# Patient Record
Sex: FEMALE | Race: OTHER | HISPANIC OR LATINO | Employment: FULL TIME | URBAN - METROPOLITAN AREA
[De-identification: names, ages, dates, MRNs, and addresses within clinical notes are randomized per-mention and may not be internally consistent; named-entity substitution may affect disease eponyms.]

---

## 2020-10-26 ENCOUNTER — HOSPITAL ENCOUNTER (EMERGENCY)
Facility: OTHER | Age: 25
Discharge: HOME | End: 2020-10-27
Attending: EMERGENCY MEDICINE
Payer: COMMERCIAL

## 2020-10-26 DIAGNOSIS — D64.9 SYMPTOMATIC ANEMIA: Primary | ICD-10-CM

## 2020-10-26 DIAGNOSIS — N92.4 EXCESSIVE BLEEDING IN PREMENOPAUSAL PERIOD: ICD-10-CM

## 2020-10-26 PROBLEM — D62 ACUTE ON CHRONIC BLOOD LOSS ANEMIA: Status: ACTIVE | Noted: 2020-10-26

## 2020-10-26 LAB
ABO + RH BLD: NORMAL
ALBUMIN SERPL BCP-MCNC: 4.1 G/DL (ref 3.5–5.2)
ALP SERPL-CCNC: 36 U/L (ref 55–135)
ALT SERPL W/O P-5'-P-CCNC: 13 U/L (ref 10–44)
ANION GAP SERPL CALC-SCNC: 12 MMOL/L (ref 8–16)
ANISOCYTOSIS BLD QL SMEAR: ABNORMAL
ANISOCYTOSIS BLD QL SMEAR: SLIGHT
AST SERPL-CCNC: 39 U/L (ref 10–40)
B-HCG UR QL: NEGATIVE
BASOPHILS # BLD AUTO: 0.01 K/UL (ref 0–0.2)
BASOPHILS # BLD AUTO: 0.04 K/UL (ref 0–0.2)
BASOPHILS # BLD AUTO: 0.04 K/UL (ref 0–0.2)
BASOPHILS NFR BLD: 0.1 % (ref 0–1.9)
BASOPHILS NFR BLD: 0.4 % (ref 0–1.9)
BASOPHILS NFR BLD: 0.5 % (ref 0–1.9)
BILIRUB SERPL-MCNC: 0.2 MG/DL (ref 0.1–1)
BLD GP AB SCN CELLS X3 SERPL QL: NORMAL
BLD PROD TYP BPU: NORMAL
BLD PROD TYP BPU: NORMAL
BLOOD UNIT EXPIRATION DATE: NORMAL
BLOOD UNIT EXPIRATION DATE: NORMAL
BLOOD UNIT TYPE CODE: 5100
BLOOD UNIT TYPE CODE: 5100
BLOOD UNIT TYPE: NORMAL
BLOOD UNIT TYPE: NORMAL
BUN SERPL-MCNC: 15 MG/DL (ref 6–20)
BURR CELLS BLD QL SMEAR: ABNORMAL
CALCIUM SERPL-MCNC: 9.1 MG/DL (ref 8.7–10.5)
CHLORIDE SERPL-SCNC: 108 MMOL/L (ref 95–110)
CO2 SERPL-SCNC: 18 MMOL/L (ref 23–29)
CODING SYSTEM: NORMAL
CODING SYSTEM: NORMAL
CREAT SERPL-MCNC: 0.8 MG/DL (ref 0.5–1.4)
CTP QC/QA: YES
CTP QC/QA: YES
DACRYOCYTES BLD QL SMEAR: ABNORMAL
DIFFERENTIAL METHOD: ABNORMAL
DISPENSE STATUS: NORMAL
DISPENSE STATUS: NORMAL
EOSINOPHIL # BLD AUTO: 0 K/UL (ref 0–0.5)
EOSINOPHIL # BLD AUTO: 0 K/UL (ref 0–0.5)
EOSINOPHIL # BLD AUTO: 0.1 K/UL (ref 0–0.5)
EOSINOPHIL NFR BLD: 0.4 % (ref 0–8)
EOSINOPHIL NFR BLD: 0.5 % (ref 0–8)
EOSINOPHIL NFR BLD: 0.5 % (ref 0–8)
ERYTHROCYTE [DISTWIDTH] IN BLOOD BY AUTOMATED COUNT: 18.7 % (ref 11.5–14.5)
ERYTHROCYTE [DISTWIDTH] IN BLOOD BY AUTOMATED COUNT: 24.2 % (ref 11.5–14.5)
ERYTHROCYTE [DISTWIDTH] IN BLOOD BY AUTOMATED COUNT: 26.2 % (ref 11.5–14.5)
EST. GFR  (AFRICAN AMERICAN): >60 ML/MIN/1.73 M^2
EST. GFR  (NON AFRICAN AMERICAN): >60 ML/MIN/1.73 M^2
FERRITIN SERPL-MCNC: <1 NG/ML (ref 20–300)
GLUCOSE SERPL-MCNC: 83 MG/DL (ref 70–110)
HCT VFR BLD AUTO: 20.8 % (ref 37–48.5)
HCT VFR BLD AUTO: 23.9 % (ref 37–48.5)
HCT VFR BLD AUTO: 25.9 % (ref 37–48.5)
HCT VFR BLD CALC: 19 %PCV (ref 36–54)
HGB BLD-MCNC: 5.4 G/DL (ref 12–16)
HGB BLD-MCNC: 6.8 G/DL (ref 12–16)
HGB BLD-MCNC: 7 G/DL
HGB BLD-MCNC: 7.6 G/DL (ref 12–16)
HYPOCHROMIA BLD QL SMEAR: ABNORMAL
HYPOCHROMIA BLD QL SMEAR: ABNORMAL
IMM GRANULOCYTES # BLD AUTO: 0.02 K/UL (ref 0–0.04)
IMM GRANULOCYTES # BLD AUTO: 0.03 K/UL (ref 0–0.04)
IMM GRANULOCYTES # BLD AUTO: 0.03 K/UL (ref 0–0.04)
IMM GRANULOCYTES NFR BLD AUTO: 0.3 % (ref 0–0.5)
IMM GRANULOCYTES NFR BLD AUTO: 0.3 % (ref 0–0.5)
IMM GRANULOCYTES NFR BLD AUTO: 0.4 % (ref 0–0.5)
IRON SERPL-MCNC: 20 UG/DL (ref 30–160)
LYMPHOCYTES # BLD AUTO: 2.3 K/UL (ref 1–4.8)
LYMPHOCYTES # BLD AUTO: 2.4 K/UL (ref 1–4.8)
LYMPHOCYTES # BLD AUTO: 2.5 K/UL (ref 1–4.8)
LYMPHOCYTES NFR BLD: 24.2 % (ref 18–48)
LYMPHOCYTES NFR BLD: 29.4 % (ref 18–48)
LYMPHOCYTES NFR BLD: 33.9 % (ref 18–48)
MCH RBC QN AUTO: 15.7 PG (ref 27–31)
MCH RBC QN AUTO: 18.4 PG (ref 27–31)
MCH RBC QN AUTO: 19.8 PG (ref 27–31)
MCHC RBC AUTO-ENTMCNC: 26 G/DL (ref 32–36)
MCHC RBC AUTO-ENTMCNC: 28.5 G/DL (ref 32–36)
MCHC RBC AUTO-ENTMCNC: 29.3 G/DL (ref 32–36)
MCV RBC AUTO: 60 FL (ref 82–98)
MCV RBC AUTO: 65 FL (ref 82–98)
MCV RBC AUTO: 67 FL (ref 82–98)
MONOCYTES # BLD AUTO: 0.5 K/UL (ref 0.3–1)
MONOCYTES # BLD AUTO: 0.6 K/UL (ref 0.3–1)
MONOCYTES # BLD AUTO: 0.7 K/UL (ref 0.3–1)
MONOCYTES NFR BLD: 6 % (ref 4–15)
MONOCYTES NFR BLD: 7.7 % (ref 4–15)
MONOCYTES NFR BLD: 8.4 % (ref 4–15)
NEUTROPHILS # BLD AUTO: 3.9 K/UL (ref 1.8–7.7)
NEUTROPHILS # BLD AUTO: 5.1 K/UL (ref 1.8–7.7)
NEUTROPHILS # BLD AUTO: 6.8 K/UL (ref 1.8–7.7)
NEUTROPHILS NFR BLD: 57.6 % (ref 38–73)
NEUTROPHILS NFR BLD: 60.8 % (ref 38–73)
NEUTROPHILS NFR BLD: 68.6 % (ref 38–73)
NRBC BLD-RTO: 0 /100 WBC
OVALOCYTES BLD QL SMEAR: ABNORMAL
OVALOCYTES BLD QL SMEAR: ABNORMAL
PLATELET # BLD AUTO: 311 K/UL (ref 150–350)
PLATELET # BLD AUTO: 343 K/UL (ref 150–350)
PLATELET # BLD AUTO: 356 K/UL (ref 150–350)
PLATELET BLD QL SMEAR: ABNORMAL
PLATELET BLD QL SMEAR: ABNORMAL
PMV BLD AUTO: 10.1 FL (ref 9.2–12.9)
PMV BLD AUTO: 10.6 FL (ref 9.2–12.9)
PMV BLD AUTO: ABNORMAL FL (ref 9.2–12.9)
POC IONIZED CALCIUM: 1.29 MMOL/L (ref 1.06–1.42)
POIKILOCYTOSIS BLD QL SMEAR: ABNORMAL
POLYCHROMASIA BLD QL SMEAR: ABNORMAL
POTASSIUM BLD-SCNC: 3.6 MMOL/L (ref 3.5–5.1)
POTASSIUM SERPL-SCNC: 3.9 MMOL/L (ref 3.5–5.1)
PROT SERPL-MCNC: 8 G/DL (ref 6–8.4)
RBC # BLD AUTO: 3.45 M/UL (ref 4–5.4)
RBC # BLD AUTO: 3.69 M/UL (ref 4–5.4)
RBC # BLD AUTO: 3.84 M/UL (ref 4–5.4)
SAMPLE: ABNORMAL
SARS-COV-2 RDRP RESP QL NAA+PROBE: NEGATIVE
SATURATED IRON: 4 % (ref 20–50)
SCHISTOCYTES BLD QL SMEAR: ABNORMAL
SODIUM BLD-SCNC: 139 MMOL/L (ref 136–145)
SODIUM SERPL-SCNC: 138 MMOL/L (ref 136–145)
TOTAL IRON BINDING CAPACITY: 531 UG/DL (ref 250–450)
TRANS ERYTHROCYTES VOL PATIENT: NORMAL ML
TRANS ERYTHROCYTES VOL PATIENT: NORMAL ML
TRANSFERRIN SERPL-MCNC: 359 MG/DL (ref 200–375)
WBC # BLD AUTO: 6.75 K/UL (ref 3.9–12.7)
WBC # BLD AUTO: 8.32 K/UL (ref 3.9–12.7)
WBC # BLD AUTO: 9.92 K/UL (ref 3.9–12.7)

## 2020-10-26 PROCEDURE — 82728 ASSAY OF FERRITIN: CPT

## 2020-10-26 PROCEDURE — 83540 ASSAY OF IRON: CPT

## 2020-10-26 PROCEDURE — 84295 ASSAY OF SERUM SODIUM: CPT

## 2020-10-26 PROCEDURE — 63600175 PHARM REV CODE 636 W HCPCS: Performed by: PHYSICIAN ASSISTANT

## 2020-10-26 PROCEDURE — 80053 COMPREHEN METABOLIC PANEL: CPT

## 2020-10-26 PROCEDURE — 85014 HEMATOCRIT: CPT | Mod: 59

## 2020-10-26 PROCEDURE — 86901 BLOOD TYPING SEROLOGIC RH(D): CPT

## 2020-10-26 PROCEDURE — 36430 TRANSFUSION BLD/BLD COMPNT: CPT

## 2020-10-26 PROCEDURE — 99900035 HC TECH TIME PER 15 MIN (STAT)

## 2020-10-26 PROCEDURE — 99282 EMERGENCY DEPT VISIT SF MDM: CPT | Mod: ,,, | Performed by: OBSTETRICS & GYNECOLOGY

## 2020-10-26 PROCEDURE — 99282 PR EMERGENCY DEPT VISIT,LEVEL II: ICD-10-PCS | Mod: ,,, | Performed by: OBSTETRICS & GYNECOLOGY

## 2020-10-26 PROCEDURE — 86920 COMPATIBILITY TEST SPIN: CPT

## 2020-10-26 PROCEDURE — 81025 URINE PREGNANCY TEST: CPT | Performed by: EMERGENCY MEDICINE

## 2020-10-26 PROCEDURE — P9021 RED BLOOD CELLS UNIT: HCPCS

## 2020-10-26 PROCEDURE — 84132 ASSAY OF SERUM POTASSIUM: CPT

## 2020-10-26 PROCEDURE — 85025 COMPLETE CBC W/AUTO DIFF WBC: CPT | Mod: 91

## 2020-10-26 PROCEDURE — 99285 EMERGENCY DEPT VISIT HI MDM: CPT | Mod: 25

## 2020-10-26 PROCEDURE — 25000003 PHARM REV CODE 250: Performed by: PHYSICIAN ASSISTANT

## 2020-10-26 PROCEDURE — 82330 ASSAY OF CALCIUM: CPT

## 2020-10-26 PROCEDURE — U0002 COVID-19 LAB TEST NON-CDC: HCPCS | Performed by: EMERGENCY MEDICINE

## 2020-10-26 RX ORDER — HYDROCODONE BITARTRATE AND ACETAMINOPHEN 500; 5 MG/1; MG/1
TABLET ORAL
Status: DISCONTINUED | OUTPATIENT
Start: 2020-10-26 | End: 2020-10-27 | Stop reason: HOSPADM

## 2020-10-26 RX ORDER — DIPHENHYDRAMINE HCL 25 MG
50 CAPSULE ORAL
Status: COMPLETED | OUTPATIENT
Start: 2020-10-26 | End: 2020-10-26

## 2020-10-26 RX ORDER — PREDNISONE 20 MG/1
40 TABLET ORAL
Status: COMPLETED | OUTPATIENT
Start: 2020-10-26 | End: 2020-10-26

## 2020-10-26 RX ORDER — ACETAMINOPHEN 500 MG
1000 TABLET ORAL
Status: COMPLETED | OUTPATIENT
Start: 2020-10-26 | End: 2020-10-26

## 2020-10-26 RX ADMIN — PREDNISONE 40 MG: 20 TABLET ORAL at 11:10

## 2020-10-26 RX ADMIN — ACETAMINOPHEN 1000 MG: 500 TABLET ORAL at 11:10

## 2020-10-26 RX ADMIN — DIPHENHYDRAMINE HYDROCHLORIDE 50 MG: 25 CAPSULE ORAL at 11:10

## 2020-10-26 NOTE — ED NOTES
Patient Identifiers for Lea Jarvis checked and correct  Assumed care of pt from ER, First unit of PRBC's infusing, denies c/o SOB/chest pain, urticaria, will continue to monitor  LOC: The patient is awake, alert and aware of environment with an appropriate affect, the patient is oriented x 3 and speaking appropriate.  APPEARANCE: Patient resting comfortably and in no acute distress, patient is clean and well groomed, patient's clothing is properly fastened.  SKIN: The skin is warm and dry, patient has normal skin turgor and moist mucus membranes,no rashes or lesions.Skin Intact , No Breakdown Noted  Musculoskeletal :  Normal range of motion noted. Moves all extremeties well, No swelling or tenderness noted  RESPIRATORY: Airway is open and patent, respirations are spontaneous, patient has a normal effort and rate.Bilateral Lungs Sounds are clear  CARDIAC: Patient has a normal rate and rhythm, no periphreal edema noted, capillary refill < 3 seconds.   ABDOMEN: Soft and non tender to palpation, no distention noted.   PULSES: 2+  And symmetrical in all extremeties  NEUROLOGIC: PERRL, The patient is awake, alert and cooperative with a calm affect, patient is aware of environment.    Will continue to monitor

## 2020-10-26 NOTE — CONSULTS
[facility]  Obstetrics & Gynecology  Consult Note    Patient Name: Lea Jarvis  MRN: [id number]  Admission Date: 10/26/2020  Hospital Length of Stay: 0 days  Code Status: No Order  Primary Care Provider: JARETH Dee  Principal Problem: Acute on chronic blood loss anemia    Inpatient consult to Obstetrics  Consult performed by: Katherine C Boecking, MD  Consult ordered by: Meño Taylor MD        Subjective:     Chief Complaint: Anemia    History of Present Illness: 26 yo  with chronic anemia presents to ED for H/H 5.4/20.8. Patient also reports heavy menstrual bleeding and is currently on her cycle. Gynecology consulted to evaluate.    On evaluation, patient sitting up comfortably in bed. She denies any dizziness or lightheadedness. She reports she is chronically anemic, and was getting routine blood work done by PCP. Her blood work came back and H/H 5.4/20.8 and was advised to present to ED for blood transfusion. She denies any transufions in the past.    Patient currently at end of her cycle. She reports her cycles have always been heavy, but have recently become heavier within the past year. She saw a Gynecologist at [facility] in January for heavy cycles. TVUS done at that time showed evidence of 2 cm fibroid. Cycles are regular, occurring approximately every 28 days. She uses a Diva Cup, and on heavy days has to change out cup every 1-2 hours. Cycles are mildly painful. She denies bleeding episodes, easy bruising, new sexual partners, vaginal discharge, dysuria, abdominal pain, nausea/vomiting, fevers/chills. She denies any history of STDs or any abnormal pap smears.     Of note, she was recently started on NuvaRing and placed ring last Monday.     No current facility-administered medications on file prior to encounter.      No current outpatient medications on file prior to encounter.       Review of patient's allergies indicates:  No Known Allergies    Past Medical History:    Diagnosis Date    Fibroids      OB History   No obstetric history on file.     History reviewed. No pertinent surgical history.  Family History     None        Tobacco Use    Smoking status: Never Smoker    Smokeless tobacco: Never Used   Substance and Sexual Activity    Alcohol use: Yes     Comment: occ    Drug use: Not Currently    Sexual activity: Not on file     Review of Systems   Constitutional: Negative for chills and fever.   Respiratory: Negative for shortness of breath.    Cardiovascular: Negative for chest pain.   Gastrointestinal: Negative for abdominal pain, constipation, diarrhea, nausea and vomiting.   Endocrine: Negative for hot flashes.   Genitourinary: Positive for dysuria, menorrhagia and vaginal bleeding. Negative for hematuria, menstrual problem and vaginal discharge.   Neurological: Negative for headaches.   Hematological: Does not bruise/bleed easily.   Psychiatric/Behavioral: Negative for depression.     Objective:     Vital Signs (Most Recent):  Temp: 99.1 °F (37.3 °C) (10/26/20 1718)  Pulse: 88 (10/26/20 1718)  Resp: 18 (10/26/20 1718)  BP: 113/61 (10/26/20 1718)  SpO2: 100 % (10/26/20 1718) Vital Signs (24h Range):  Temp:  [99.1 °F (37.3 °C)-99.4 °F (37.4 °C)] 99.1 °F (37.3 °C)  Pulse:  [83-90] 88  Resp:  [18] 18  SpO2:  [100 %] 100 %  BP: (109-129)/(58-75) 113/61     Weight: 59 kg (130 lb)  Body mass index is 23.78 kg/m².  No LMP recorded.    Physical Exam:   Constitutional: She is oriented to person, place, and time. She appears well-developed and well-nourished. No distress.    HENT:   Head: Normocephalic and atraumatic.    Eyes: Pupils are equal, round, and reactive to light.    Neck: Normal range of motion.    Cardiovascular: Normal rate.     Pulmonary/Chest: Effort normal. No respiratory distress.        Abdominal: Soft. Normal appearance. She exhibits no distension. There is no abdominal tenderness. There is no rebound and no guarding.     Genitourinary:    Uterus, right  adnexa and left adnexa normal.   Cervix is normal. There is a normal right adnexa and a normal left adnexa. There is bleeding in the vagina. Cervix exhibits no motion tenderness, no discharge and no friability.    Genitourinary Comments: External genitalia: Normal  Urethra: No tenderness; normal meatus  Bladder: No tenderness  Scant blood noted in vaginal vault cleared with one proctoswab. No active bleeding from cervical os.              Musculoskeletal: Normal range of motion.       Neurological: She is alert and oriented to person, place, and time.    Skin: Skin is warm and dry.    Psychiatric: She has a normal mood and affect. Her behavior is normal. Mood, affect, judgment and thought content normal.       Laboratory:  CBC:   Recent Labs   Lab 10/26/20  1549 10/26/20  1600   WBC 6.75  --    RBC 3.45*  --    HGB 5.4*  --    HCT 20.8* 19*   *  --    MCV 60*  --    MCH 15.7*  --    MCHC 26.0*  --        Diagnostic Results:  TVUS   Dhiraj, Rad Results In - 07/06/2020 11:32 AM CDT    INDICATION: Abnormal uterine bleeding, possible fibroid    COMPARISON: None    TECHNIQUE: Multiple grayscale and color Doppler images of the pelvis were obtained in the supine position utilizing the transabdominaltechnique.  Spectral Doppler evaluation of both ovaries was performed.    FINDINGS:     The uterus is anteverted measuring 10.8 cm length by 6.0 cm AP by 7.4 cm transverse dimensions.  A posterior myometrial fibroid demonstrates decreased echogenicity and measures 2.3 x 2.2 x 2.1 cm.  There appears to be a small broad-based submucosal component.  Endometrial stripe is diffusely thinned measuring 2 mm in thickness.    The RIGHT ovary measures 3.6 x 1.8 x 2.1 cm.  Normal sonographic appearance to the RIGHT ovary.  LEFT ovary measures 3.6 x 1.7 x 3.1 cm.  Normal sonographic appearance of the LEFT ovary.  Spectral Doppler evaluation demonstrates normal arterial and venous waveforms.  No free fluid in the  cul-de-sac.    IMPRESSION:   ::    1.   2.3 cm posterior myometrial fibroid with small broad-based submucosal component.  2.   Homogeneous endometrial stripe measuring 2 mm in thickness.    Assessment/Plan:     Active Diagnoses:    Diagnosis Date Noted POA    PRINCIPAL PROBLEM:  Acute on chronic blood loss anemia [D62] 10/26/2020 Yes      Problems Resolved During this Admission:       1. Acute on Chronic Anemia  - VSS  - Asymptomatic  - H/H 5.4/20.8  - Agree with transfusion 1 uPRBC    2. AUB  - Reports heavier cycles requiring divacup change q1-2 hours  - TVUS with 2 cm fibroid, otherwise normal uterus  - Follows Dr. Renner at [facility]  - Recently started NuvaRing 1 week ago, discussed options such as additional OCP to control acute bleeding episode and to prevent bleeding in the future, or waiting to see if NuvaRing will work  - Patient desires to wait for NuvaRing and does not want additional OCPs at this time  - Offered follow up in our clinic within the week, or she may see her primary Ob/Gyn. She is undecided on who she wants to see, but will follow up within a week.     Thank you for your consult. I will sign off. Please contact us if you have any additional questions.    Katherine C Boecking, MD  Obstetrics & Gynecology  [facility]

## 2020-10-26 NOTE — ED TRIAGE NOTES
Pt reports to ED with c/o abnormal lab x 3 days. Pt states she was seen at PCP on Friday and had blood drawn. Pt was called by PCP today and informed of Hemoglobin <5, was told to report to ED for transfusion. Pt denies any lightheadedness, LOC, other s/s. Pt has hx of uterine fibroids, state she has been having heavier cycles since January. Pt is currently on her menstrual cycle.

## 2020-10-26 NOTE — ED NOTES
Received report from JOHNNY Pantoja. Assumed care of patient at this time. Patient lying on stretcher with HOB elevated.  AAOx4 and appropriate at this time. Restroom and comfort needs addressed.  Pt updated on POC. RR even and unlabored, NAD noted. No acute distress noted.  SRx2 up, bed in lowest position, call light within reach.  Will continue to monitor.

## 2020-10-26 NOTE — H&P
ED Observation Unit  History and Physical      I assumed care of this patient from the Main ED at onset of observation time, 5:23 PM on 10/26/2020.       History of Present Illness:    This is a 25 y.o. female who presents with complaint of abnormal labs. The patient had routine blood work drawn on 10/23/2020, which was ordered by her PCP. She received a phone call today from her PCP stating that her hemoglobin was 5. The patient reports that she has regular menstrual periods, but they last longer than seven days and are heavy secondary to fibroids. She notes that she is a little more pale. She denies chest pain and shortness of breath but does note decreased exercise tolerance when working out she attributed to not working out regularly.      I reviewed the ED Provider Note dated 10/26/2020 prior to my evaluation of this patient.  I reviewed all labs and imaging performed in the Main ED, prior to patient being placed in Observation. Patient was placed in the ED Observation Unit for Acute on chronic blood loss anemia.    PMHx   Past Medical History:   Diagnosis Date    Fibroids       History reviewed. No pertinent surgical history.     Family Hx   History reviewed. No pertinent family history.     Social Hx   Social History     Socioeconomic History    Marital status: Single     Spouse name: Not on file    Number of children: Not on file    Years of education: Not on file    Highest education level: Not on file   Occupational History    Not on file   Social Needs    Financial resource strain: Not on file    Food insecurity     Worry: Not on file     Inability: Not on file    Transportation needs     Medical: Not on file     Non-medical: Not on file   Tobacco Use    Smoking status: Never Smoker    Smokeless tobacco: Never Used   Substance and Sexual Activity    Alcohol use: Yes     Comment: occ    Drug use: Not Currently    Sexual activity: Not on file   Lifestyle    Physical activity     Days per  week: Not on file     Minutes per session: Not on file    Stress: Not on file   Relationships    Social connections     Talks on phone: Not on file     Gets together: Not on file     Attends [employer] service: Not on file     Active member of club or organization: Not on file     Attends meetings of clubs or organizations: Not on file     Relationship status: Not on file   Other Topics Concern    Not on file   Social History Narrative    Not on file        Vital Signs   Vitals:    10/26/20 1534 10/26/20 1631 10/26/20 1703 10/26/20 1718   BP: (!) 115/58 109/75 115/65 113/61   BP Location:       Patient Position:       Pulse: 89 83 90 88   Resp:   18 18   Temp:   99.4 °F (37.4 °C) 99.1 °F (37.3 °C)   TempSrc:   Oral Oral   SpO2: 100% 100% 100% 100%   Weight:       Height:            Review of Systems  Review of Systems   Genitourinary:        Vaginal bleeding        Physical Exam  Physical Exam   Constitutional: She is oriented to person, place, and time and well-developed, well-nourished, and in no distress. No distress.   HENT:   Head: Normocephalic and atraumatic.   Eyes: Pupils are equal, round, and reactive to light. Conjunctivae are normal.   Neck: Normal range of motion. No JVD present. No tracheal deviation present. No thyromegaly present.   Cardiovascular: Normal heart sounds and intact distal pulses.   Pulmonary/Chest: Effort normal. No stridor.   Abdominal: Soft.   Genitourinary:    Genitourinary Comments: Defer to GYN staff     Musculoskeletal: Normal range of motion.   Lymphadenopathy:     She has no cervical adenopathy.   Neurological: She is alert and oriented to person, place, and time. GCS score is 15.   Skin: Skin is warm. She is not diaphoretic.   Nursing note and vitals reviewed.      Medications:   Scheduled Meds:  Continuous Infusions:  PRN Meds:.sodium chloride      Assessment/Plan:  1. Acute on Chronic Anemia  - VSS  - Asymptomatic at this time  - H/H 5.4/20.8  - Currently getting  transfusion of 1u PRBC, will re-check CBC once complete     2. AUB  - Reports heavier cycles requiring divacup change q1-2 hours  - TVUS with 2 cm fibroid, otherwise normal uterus  - Follow GYN recs for outpatient follow-up.  - Thanks GYN for your consult     Case was discussed with the ED provider, Brandon (any consultants).

## 2020-10-26 NOTE — FIRST PROVIDER EVALUATION
Emergency Department TeleTriage Encounter Note      CHIEF COMPLAINT    Chief Complaint   Patient presents with    Abnormal Lab     Pt recieved a call from her PCP today that her Hmgb was 5 from lab work that was done last week. Sent here for a transfusion. pt reports eating more ice than usual and heavy vag bleeding secondary to fibroids.        VITAL SIGNS   Initial Vitals [10/26/20 1506]   BP Pulse Resp Temp SpO2   129/67 87 18 99.1 °F (37.3 °C) 100 %      MAP       --            ALLERGIES    Review of patient's allergies indicates:  No Known Allergies    PROVIDER TRIAGE NOTE  Patient is a 25 y.o. female presenting for anemia. Had outpatient labs done by PCP last week, contacted today and told to come in for blood transfusion, hemoglobin was 5. Denies history of blood transfusion. Repeat labs ordered.     ORDERS  Labs Reviewed   HIV 1 / 2 ANTIBODY   HEPATITIS C ANTIBODY   CBC W/ AUTO DIFFERENTIAL   COMPREHENSIVE METABOLIC PANEL   POCT URINE PREGNANCY   TYPE & SCREEN       ED Orders (720h ago, onward)    Start Ordered     Status Ordering Provider    10/26/20 1521 10/26/20 1520  CBC auto differential  STAT  Collect    Ordered MARY QUACH    10/26/20 1521 10/26/20 1520  Comprehensive metabolic panel  STAT  Collect    Ordered CARLTON QUACHOLE    10/26/20 1521 10/26/20 1520  Type & Screen  STAT      Ordered MARY QUACH    10/26/20 1521 10/26/20 1520  Saline lock IV  Once      Ordered CARLTON QUACHOLE    10/26/20 1509 10/26/20 1508  POCT urine pregnancy  Once      Ordered ALEXANDER SOLANO L. II    10/26/20 1508 10/26/20 1508  HIV 1/2 Ag/Ab (4th Gen)  STAT  Collect    Ordered ALEXANDER SOLANO L. II    10/26/20 1508 10/26/20 1508  Hepatitis C antibody  STAT  Collect    Ordered ALEXANDER SOLANO L. II            Virtual Visit Note: The provider triage portion of this emergency department evaluation and documentation was performed via [email or web address], a HIPAA-compliant telemedicine application, in concert with a tele-presenter  in the room. A face to face patient evaluation with one of my colleagues will occur once the patient is placed in an emergency department room.      DISCLAIMER: This note was prepared with [employer] voice recognition transcription software. Garbled syntax, mangled pronouns, and other bizarre constructions may be attributed to that software system.

## 2020-10-27 VITALS
HEIGHT: 62 IN | WEIGHT: 130 LBS | DIASTOLIC BLOOD PRESSURE: 59 MMHG | TEMPERATURE: 99 F | SYSTOLIC BLOOD PRESSURE: 108 MMHG | BODY MASS INDEX: 23.92 KG/M2 | RESPIRATION RATE: 18 BRPM | OXYGEN SATURATION: 100 % | HEART RATE: 82 BPM

## 2020-10-27 RX ORDER — FERROUS SULFATE 325(65) MG
325 TABLET ORAL DAILY
Qty: 90 TABLET | Refills: 0 | Status: SHIPPED | OUTPATIENT
Start: 2020-10-27 | End: 2022-09-06

## 2020-10-27 RX ORDER — DOCUSATE SODIUM 100 MG/1
100 CAPSULE, LIQUID FILLED ORAL 2 TIMES DAILY PRN
Qty: 60 CAPSULE | Refills: 0 | Status: SHIPPED | OUTPATIENT
Start: 2020-10-27 | End: 2022-09-06

## 2020-10-27 NOTE — PROGRESS NOTES
[facility] ED Observation Unit  Discharge Summary        History of Present Illness:    This is a 25 y.o. female who presents with complaint of abnormal labs. The patient had routine blood work drawn on 10/23/2020, which was ordered by her PCP. She received a phone call today from her PCP stating that her hemoglobin was 5. The patient reports that she has regular menstrual periods, but they last longer than seven days and are heavy secondary to fibroids. She notes that she is a little more pale. She denies chest pain and shortness of breath but does note decreased exercise tolerance when working out she attributed to not working out regularly.     Observation Course:    Patient admitted for transfusion of an asymptomatic anemia that was critically low on labs obtained by her PCP. After 1 unit PRBC, patient's H/H improved to appropriate level. Patient did have hives during transfusion which resolved with benadryl. Patient was able to complete transfusion at slower rate. The rest of patient's course was unremarkable and patient requested discharge.     Consultants:    OBGYN    Final Diagnosis:  Acute on Chronic Anemia  Abnormal Uterine Bleeding    Assessment / Plan:  1. Acute on Chronic Anemia  - Vital signs normal  - Asymptomatic at this time  - H/H 7/25 after transfusion  - Will initiate Iron / colace treatment     2. AUB  - Evaluated by OBGYN, recs appreciated  - Follow up in one weekwith OBGYN    3. Transfusion Reaction  - Minor and resolved      Discharge Condition: Good    Disposition: Home     Time spent on the discharge of the patient including review of hospital course with the patient. reviewing discharge medications and arranging follow-up care 35 minutes.  Patient was seen and examined on the date of discharge and determined to be suitable for discharge.    Follow Up:  May follow up with Ha Lopez whom she sees as an outpatient or with [facility].        Assessment/plan    Patient  discharged from ED observation at 3:44 a.m., 10/27/2020.  Total observation time was

## 2020-10-27 NOTE — PROGRESS NOTES
ED Observation Unit  Progress Note      HPI   This is a 25 y.o. female who presents with complaint of abnormal labs. The patient had routine blood work drawn on 10/23/2020, which was ordered by her PCP. She received a phone call today from her PCP stating that her hemoglobin was 5. The patient reports that she has regular menstrual periods, but they last longer than seven days and are heavy secondary to fibroids. She notes that she is a little more pale. She denies chest pain and shortness of breath but does note decreased exercise tolerance when working out she attributed to not working out regularly.       I reviewed the ED Provider Note dated 10/26/2020 prior to my evaluation of this patient.  I reviewed all labs and imaging performed in the Main ED, prior to patient being placed in Observation. Patient was placed in the ED Observation Unit for Acute on chronic blood loss anemia.    Interval History   Patient doing well in ED OU, stable vital signs and no complaints. 9:02 PM she has completed one unit of PRBC and has an increase in H&H to 6.8 and 23.9 (up from 5.4 & 20.8). Vaginal bleeding has not changed throughout ED OU stay thus far.     PMHx   Past Medical History:   Diagnosis Date    Fibroids       History reviewed. No pertinent surgical history.     Family Hx   History reviewed. No pertinent family history.     Social Hx   Social History     Socioeconomic History    Marital status: Single     Spouse name: Not on file    Number of children: Not on file    Years of education: Not on file    Highest education level: Not on file   Occupational History    Not on file   Social Needs    Financial resource strain: Not on file    Food insecurity     Worry: Not on file     Inability: Not on file    Transportation needs     Medical: Not on file     Non-medical: Not on file   Tobacco Use    Smoking status: Never Smoker    Smokeless tobacco: Never Used   Substance and Sexual Activity    Alcohol use: Yes      Comment: occ    Drug use: Not Currently    Sexual activity: Not on file   Lifestyle    Physical activity     Days per week: Not on file     Minutes per session: Not on file    Stress: Not on file   Relationships    Social connections     Talks on phone: Not on file     Gets together: Not on file     Attends Rastafarian service: Not on file     Active member of club or organization: Not on file     Attends meetings of clubs or organizations: Not on file     Relationship status: Not on file   Other Topics Concern    Not on file   Social History Narrative    Not on file        Vital Signs   Vitals:    10/26/20 1718 10/26/20 1743 10/26/20 2008 10/26/20 2034   BP: 113/61 124/66 110/67 (!) 111/57   BP Location:  Left arm     Patient Position:  Sitting     Pulse: 88 92 89 84   Resp: 18 18 18 18   Temp: 99.1 °F (37.3 °C) 98.9 °F (37.2 °C) 98.1 °F (36.7 °C) 98.9 °F (37.2 °C)   TempSrc: Oral Oral     SpO2: 100% 100% 100% 100%   Weight:       Height:            Review of Systems  Review of Systems   Constitutional: Negative for chills and fever.   HENT: Negative for hearing loss and tinnitus.    Eyes: Negative for blurred vision and double vision.   Respiratory: Negative for shortness of breath.    Cardiovascular: Negative for chest pain.   Gastrointestinal: Negative for abdominal pain, nausea and vomiting.   Genitourinary:        Vaginal bleeding    Musculoskeletal: Negative for back pain and neck pain.   Neurological: Negative for dizziness, weakness and headaches.       Brief Physical Exam/Reassessment   Physical Exam   Constitutional: She is oriented to person, place, and time and well-developed, well-nourished, and in no distress. No distress.   HENT:   Head: Normocephalic and atraumatic.   Eyes: Pupils are equal, round, and reactive to light.   Neck: Normal range of motion.   Cardiovascular: Normal rate.   Pulmonary/Chest: Effort normal and breath sounds normal. No respiratory distress. She exhibits no tenderness.    Abdominal: Soft.   Musculoskeletal: Normal range of motion.   Lymphadenopathy:     She has no cervical adenopathy.   Neurological: She is alert and oriented to person, place, and time. GCS score is 15.   Skin: Skin is warm and dry. She is not diaphoretic. No pallor.   Nursing note and vitals reviewed.      Labs/Imaging   Labs Reviewed   CBC W/ AUTO DIFFERENTIAL - Abnormal; Notable for the following components:       Result Value    RBC 3.45 (*)     Hemoglobin 5.4 (*)     Hematocrit 20.8 (*)     Mean Corpuscular Volume 60 (*)     Mean Corpuscular Hemoglobin 15.7 (*)     Mean Corpuscular Hemoglobin Conc 26.0 (*)     RDW 18.7 (*)     Platelets 356 (*)     All other components within normal limits    Narrative:     Hgb  critical result(s) called and verbal readback obtained from   Alia Rodriguez by TAW1 10/26/2020 16:30   COMPREHENSIVE METABOLIC PANEL - Abnormal; Notable for the following components:    CO2 18 (*)     Alkaline Phosphatase 36 (*)     All other components within normal limits   IRON AND TIBC - Abnormal; Notable for the following components:    Iron 20 (*)     TIBC 531 (*)     Saturated Iron 4 (*)     All other components within normal limits   CBC W/ AUTO DIFFERENTIAL - Abnormal; Notable for the following components:    RBC 3.69 (*)     Hemoglobin 6.8 (*)     Hematocrit 23.9 (*)     Mean Corpuscular Volume 65 (*)     Mean Corpuscular Hemoglobin 18.4 (*)     Mean Corpuscular Hemoglobin Conc 28.5 (*)     RDW 24.2 (*)     All other components within normal limits   ISTAT PROCEDURE - Abnormal; Notable for the following components:    POC Hematocrit 19 (*)     All other components within normal limits   HIV 1 / 2 ANTIBODY   HEPATITIS C ANTIBODY   FERRITIN   POCT URINE PREGNANCY   SARS-COV-2 RDRP GENE   TYPE & SCREEN   PREPARE RBC SOFT   PREPARE RBC SOFT      Imaging Results    None          I reviewed all labs, imaging, EKGs.     Plan   1. Acute on Chronic Anemia  - VSS  - continues to be asx in EDOU  - H/H  initially 5.4/20.8 up to 6.8/23.9 after 1st unit of PRBC, plan to transfuse another unit now.   -will re-check CBC once complete and likely discharge if H&H increase appropriately.    2. AUB  - heavier cycles recently  -known fibroids on TVUS  -GYN is Dr. Renner at [facility], pt plans to follow-up at [facility] but is also given [facility] GYN follow-up info if needed  -continue current NuvaRing regimen, pt opts to not start additional OCPs at this time.   -Follow-up plan recommended by GYN who has signed off after initial consult.   -Thanks to GYN for your consult.       I have discussed this case with CESAR Taylor MD.

## 2020-10-27 NOTE — ED NOTES
Per provider it is okay for patient to be discharged from observation unit to home. Provider has discussed the findings with the patient. Discharge paperwork and education has been given to the patient at this time. Patient in NAD. She is able to ambulate with a strong and steady gait. Respirations are even and unlabored. Nothing further at this time.

## 2020-10-27 NOTE — ED NOTES
Rounding on patient completed.  Patient lying on stretcher with HOB elevated.  Pt lying in bed, RR even and unlabored, NAD noted. No acute distress noted.  SRx2 up, bed in lowest position, call light within reach. Pt has no complaints or needs at this time.  Updated patient on POC.  Will continue to monitor.

## 2020-10-27 NOTE — ED NOTES
Rounding on patient completed.  Patient lying on stretcher with HOB elevated.  Pt resting comfortably, boyfriend remains at bedside. RR even and unlabored, NAD noted. No acute distress noted.  SRx2 up, bed in lowest position, call light within reach.  Will continue to monitor.

## 2020-10-27 NOTE — ED NOTES
Blood transfusion stopped 50ml of blood left in bag.  Per OLIVIA Tavarez blood transfusion will not be started again.  At this time blood transfusion complete

## 2020-10-27 NOTE — ED NOTES
Pt called over call light.  Reports to RN that hives have began to pop up on chest and legs. Blood infusion stopped line flushed with NS.  OLIVIA Tavarez notified.

## 2020-10-27 NOTE — ED NOTES
Rounding on patient completed.  Patient lying on stretcher with HOB elevated.  Pt asleep resting comfortably. RR even and unlabored, NAD noted. No acute distress noted.  SRx2 up, bed in lowest position, call light within reach.  Will continue to monitor.

## 2020-10-27 NOTE — ED NOTES
Blood transfusion complete. Pt tolerated well. Repeat CBC complete awaiting results.  Pt updated on POC, boyfriend at bedside. Will continue to monitor.

## 2020-10-27 NOTE — PROGRESS NOTES
ED Observation Unit  Progress Note      HPI   This is a 25 y.o. female who presents with complaint of abnormal labs. The patient had routine blood work drawn on 10/23/2020, which was ordered by her PCP. She received a phone call today from her PCP stating that her hemoglobin was 5. The patient reports that she has regular menstrual periods, but they last longer than seven days and are heavy secondary to fibroids. She notes that she is a little more pale. She denies chest pain and shortness of breath but does note decreased exercise tolerance when working out she attributed to not working out regularly.       I reviewed the ED Provider Note dated 10/26/2020 prior to my evaluation of this patient.  I reviewed all labs and imaging performed in the Main ED, prior to patient being placed in Observation. Patient was placed in the ED Observation Unit for Acute on chronic blood loss anemia.    Interval History   Patient developed scattered urticaria to legs and torso during transfusion of second unit of blood. No other system involvement. Blood was stopped, benadryl given. Will plan for OBS in ED for 4 hours to monitor for progression/resolution of symptoms.     PMHx   Past Medical History:   Diagnosis Date    Fibroids       History reviewed. No pertinent surgical history.     Family Hx   History reviewed. No pertinent family history.     Social Hx   Social History     Socioeconomic History    Marital status: Single     Spouse name: Not on file    Number of children: Not on file    Years of education: Not on file    Highest education level: Not on file   Occupational History    Not on file   Social Needs    Financial resource strain: Not on file    Food insecurity     Worry: Not on file     Inability: Not on file    Transportation needs     Medical: Not on file     Non-medical: Not on file   Tobacco Use    Smoking status: Never Smoker    Smokeless tobacco: Never Used   Substance and Sexual Activity    Alcohol  use: Yes     Comment: occ    Drug use: Not Currently    Sexual activity: Not on file   Lifestyle    Physical activity     Days per week: Not on file     Minutes per session: Not on file    Stress: Not on file   Relationships    Social connections     Talks on phone: Not on file     Gets together: Not on file     Attends Holiness service: Not on file     Active member of club or organization: Not on file     Attends meetings of clubs or organizations: Not on file     Relationship status: Not on file   Other Topics Concern    Not on file   Social History Narrative    Not on file        Vital Signs   Vitals:    10/26/20 2008 10/26/20 2034 10/26/20 2121 10/26/20 2144   BP: 110/67 (!) 111/57 117/60 (!) 115/58   BP Location:       Patient Position:       Pulse: 89 84 90 93   Resp: 18 18 18 18   Temp: 98.1 °F (36.7 °C) 98.9 °F (37.2 °C) 99.1 °F (37.3 °C) 99.6 °F (37.6 °C)   TempSrc:   Oral Oral   SpO2: 100% 100% 100% 100%   Weight:       Height:            Review of Systems  Review of Systems   Skin:        urticaria       Brief Physical Exam/Reassessment   Physical Exam   Constitutional: She is well-developed, well-nourished, and in no distress. No distress.   HENT:   Head: Normocephalic.   oropharynx is clear    Eyes: Pupils are equal, round, and reactive to light.   Neck: Normal range of motion.   Cardiovascular: Normal rate and normal heart sounds.   Pulmonary/Chest: Effort normal. No respiratory distress. She has no wheezes. She has no rales.   Lungs CTA bilaterally    Abdominal: Soft.   Benign    Skin: Rash noted. She is not diaphoretic.   Scattered urticaria to bilateral upper legs and chest wall.    Nursing note and vitals reviewed.      Labs/Imaging   Labs Reviewed   CBC W/ AUTO DIFFERENTIAL - Abnormal; Notable for the following components:       Result Value    RBC 3.45 (*)     Hemoglobin 5.4 (*)     Hematocrit 20.8 (*)     Mean Corpuscular Volume 60 (*)     Mean Corpuscular Hemoglobin 15.7 (*)     Mean  Corpuscular Hemoglobin Conc 26.0 (*)     RDW 18.7 (*)     Platelets 356 (*)     All other components within normal limits    Narrative:     Hgb  critical result(s) called and verbal readback obtained from   Alia Rodriguez by TAW1 10/26/2020 16:30   COMPREHENSIVE METABOLIC PANEL - Abnormal; Notable for the following components:    CO2 18 (*)     Alkaline Phosphatase 36 (*)     All other components within normal limits   IRON AND TIBC - Abnormal; Notable for the following components:    Iron 20 (*)     TIBC 531 (*)     Saturated Iron 4 (*)     All other components within normal limits   FERRITIN - Abnormal; Notable for the following components:    Ferritin <1 (*)     All other components within normal limits   CBC W/ AUTO DIFFERENTIAL - Abnormal; Notable for the following components:    RBC 3.69 (*)     Hemoglobin 6.8 (*)     Hematocrit 23.9 (*)     Mean Corpuscular Volume 65 (*)     Mean Corpuscular Hemoglobin 18.4 (*)     Mean Corpuscular Hemoglobin Conc 28.5 (*)     RDW 24.2 (*)     All other components within normal limits   ISTAT PROCEDURE - Abnormal; Notable for the following components:    POC Hematocrit 19 (*)     All other components within normal limits   HIV 1 / 2 ANTIBODY   HEPATITIS C ANTIBODY   POCT URINE PREGNANCY   SARS-COV-2 RDRP GENE   TYPE & SCREEN   PREPARE RBC SOFT   PREPARE RBC SOFT      Imaging Results    None          I reviewed all labs, imaging, EKGs.     Plan   Plan   1. Acute on Chronic Anemia  - VSS  - continues to be asx in EDOU  - H/H initially 5.4/20.8 up to 6.8/23.9 after 1st unit of PRBC, plan to transfuse another unit now.   -will re-check CBC once complete and likely discharge if H&H increase appropriately.  -developed scattered urticaria after most of the second unit of blood was transfused. Blood was stopped and patient was given 50 mg of benadryl PO with 1g of tylenol and NS. Will monitor for 4 hours.      2. AUB  - heavier cycles recently  -known fibroids on TVUS  -[facility], pt plans to follow-up at [facility] but is also given [facility] GYN follow-up info if needed  -continue current NuvaRing regimen, pt opts to not start additional OCPs at this time.   -Follow-up plan recommended by GYN who has signed off after initial consult.   -Thanks to GYN for your consult.     I have discussed this case with Huey

## 2020-10-27 NOTE — ED NOTES
Rounding on patient completed.  Pt asking for IVs to be removed. Pt updated on POC.  Patient lying on stretcher with HOB elevated.  Pt resting comfortably. RR even and unlabored, NAD noted. No acute distress noted.  SRx2 up, bed in lowest position, call light within reach.  Will continue to monitor.

## 2020-11-02 ENCOUNTER — OFFICE VISIT (OUTPATIENT)
Dept: OBSTETRICS AND GYNECOLOGY | Facility: CLINIC | Age: 25
End: 2020-11-02
Payer: COMMERCIAL

## 2020-11-02 VITALS
DIASTOLIC BLOOD PRESSURE: 66 MMHG | SYSTOLIC BLOOD PRESSURE: 120 MMHG | BODY MASS INDEX: 25.97 KG/M2 | WEIGHT: 141.13 LBS | HEIGHT: 62 IN

## 2020-11-02 DIAGNOSIS — N92.0 MENORRHAGIA WITH REGULAR CYCLE: Primary | ICD-10-CM

## 2020-11-02 PROCEDURE — 99999 PR PBB SHADOW E&M-EST. PATIENT-LVL II: CPT | Mod: PBBFAC,,, | Performed by: NURSE PRACTITIONER

## 2020-11-02 PROCEDURE — 99203 OFFICE O/P NEW LOW 30 MIN: CPT | Mod: S$GLB,,, | Performed by: NURSE PRACTITIONER

## 2020-11-02 PROCEDURE — 99999 PR PBB SHADOW E&M-EST. PATIENT-LVL II: ICD-10-PCS | Mod: PBBFAC,,, | Performed by: NURSE PRACTITIONER

## 2020-11-02 PROCEDURE — 3008F BODY MASS INDEX DOCD: CPT | Mod: CPTII,S$GLB,, | Performed by: NURSE PRACTITIONER

## 2020-11-02 PROCEDURE — 3008F PR BODY MASS INDEX (BMI) DOCUMENTED: ICD-10-PCS | Mod: CPTII,S$GLB,, | Performed by: NURSE PRACTITIONER

## 2020-11-02 PROCEDURE — 99203 PR OFFICE/OUTPT VISIT, NEW, LEVL III, 30-44 MIN: ICD-10-PCS | Mod: S$GLB,,, | Performed by: NURSE PRACTITIONER

## 2020-11-02 RX ORDER — ETONOGESTREL AND ETHINYL ESTRADIOL VAGINAL RING .015; .12 MG/D; MG/D
RING VAGINAL
COMMUNITY
Start: 2020-10-16 | End: 2022-09-06

## 2020-11-02 RX ORDER — ETONOGESTREL AND ETHINYL ESTRADIOL VAGINAL RING .015; .12 MG/D; MG/D
1 RING VAGINAL
Qty: 3 EACH | Refills: 4 | Status: SHIPPED | OUTPATIENT
Start: 2020-11-02 | End: 2021-11-02

## 2020-11-02 NOTE — PROGRESS NOTES
CC: ED follow up     HPI: Pt is a 25 y.o.  female who presents as an ED follow up.  She was sent to the ED by her PCP for blood transfusion due to anemia on lab work H and H was 5/20.  She was transfused with 2 units of PRBCs. She reports long cycles.  Last cycle was 12 days and heavy.  Reports some cramping with her cycle.  UPT is negative. She had a pelvic US at [facility] in 5/20 which revealed a 2 cm fibroid - otherwise was unremarkable.  She was started on Nuvaring and iron supplements 2 weeks ago. Denies any current dizziness, SOB, lightheadedness, and reports fatigue has improved since the transfusion.     ROS:  GENERAL: Feeling well overall. Denies fever or chills.   SKIN: Denies rash or lesions.   HEAD: Denies head injury or headache.   NODES: Denies enlarged lymph nodes.   CHEST: Denies chest pain or shortness of breath.   CARDIOVASCULAR: Denies palpitations or left sided chest pain.   ABDOMEN: No abdominal pain, constipation, diarrhea, nausea, vomiting or rectal bleeding.   URINARY: No dysuria, hematuria, or burning on urination.  REPRODUCTIVE: See HPI.   BREASTS: Denies pain, lumps, or nipple discharge.   HEMATOLOGIC: No easy bruisability or excessive bleeding.   MUSCULOSKELETAL: Denies joint pain or swelling.   NEUROLOGIC: Denies syncope or weakness.   PSYCHIATRIC: Denies depression, anxiety or mood swings.    PE:   APPEARANCE: Well nourished, well developed, Other female in no acute distress.  PELVIS: deferred       Diagnosis:  1. Menorrhagia with regular cycle        Plan:   Continue Nuvaring  Discussed if cycles remain heavy with Nuvaring to notify clinic and will obtain a repeat pelvic US and send to MD for additional eval  Discussed increase dietary iron- green leafy veggies and lean meat   She has f/u scheduled with PCP next week for repeat labs/ ED follow up.  Discussed ED precautions for symptomatic anemia       Orders Placed This Encounter    etonogestreL-ethinyl estradioL (NUVARING) 0.12-0.015  mg/24 hr vaginal ring         Follow-up PRN no resolution of symptoms.    Rosa Salcedo, ARUNP-C

## 2020-11-08 ENCOUNTER — PATIENT MESSAGE (OUTPATIENT)
Dept: OBSTETRICS AND GYNECOLOGY | Facility: CLINIC | Age: 25
End: 2020-11-08

## 2020-11-08 DIAGNOSIS — N92.0 MENORRHAGIA WITH REGULAR CYCLE: Primary | ICD-10-CM

## 2020-11-14 ENCOUNTER — PATIENT MESSAGE (OUTPATIENT)
Dept: OBSTETRICS AND GYNECOLOGY | Facility: CLINIC | Age: 25
End: 2020-11-14

## 2020-11-28 ENCOUNTER — PATIENT MESSAGE (OUTPATIENT)
Dept: OBSTETRICS AND GYNECOLOGY | Facility: CLINIC | Age: 25
End: 2020-11-28

## 2022-09-06 ENCOUNTER — HOSPITAL ENCOUNTER (INPATIENT)
Facility: OTHER | Age: 27
LOS: 2 days | Discharge: HOME | DRG: 812 | End: 2022-09-08
Attending: EMERGENCY MEDICINE | Admitting: HOSPITALIST
Payer: COMMERCIAL

## 2022-09-06 DIAGNOSIS — R00.0 TACHYCARDIA: ICD-10-CM

## 2022-09-06 DIAGNOSIS — D50.0 IRON DEFICIENCY ANEMIA DUE TO CHRONIC BLOOD LOSS: ICD-10-CM

## 2022-09-06 DIAGNOSIS — N92.0 MENORRHAGIA WITH REGULAR CYCLE: ICD-10-CM

## 2022-09-06 DIAGNOSIS — R07.9 CHEST PAIN: ICD-10-CM

## 2022-09-06 DIAGNOSIS — Z87.42 HISTORY OF MENORRHAGIA: ICD-10-CM

## 2022-09-06 DIAGNOSIS — D64.9 SYMPTOMATIC ANEMIA: Primary | ICD-10-CM

## 2022-09-06 DIAGNOSIS — D21.9 LEIOMYOMA: ICD-10-CM

## 2022-09-06 LAB
ABO + RH BLD: NORMAL
ALBUMIN SERPL BCP-MCNC: 4 G/DL (ref 3.5–5.2)
ALP SERPL-CCNC: 41 U/L (ref 55–135)
ALT SERPL W/O P-5'-P-CCNC: 12 U/L (ref 10–44)
ANION GAP SERPL CALC-SCNC: 11 MMOL/L (ref 8–16)
ANISOCYTOSIS BLD QL SMEAR: SLIGHT
AST SERPL-CCNC: 18 U/L (ref 10–40)
BASOPHILS # BLD AUTO: 0.01 K/UL (ref 0–0.2)
BASOPHILS NFR BLD: 0.2 % (ref 0–1.9)
BILIRUB SERPL-MCNC: 0.4 MG/DL (ref 0.1–1)
BLD GP AB SCN CELLS X3 SERPL QL: NORMAL
BLD PROD TYP BPU: NORMAL
BLD PROD TYP BPU: NORMAL
BLOOD UNIT EXPIRATION DATE: NORMAL
BLOOD UNIT EXPIRATION DATE: NORMAL
BLOOD UNIT TYPE CODE: 5100
BLOOD UNIT TYPE CODE: 5100
BLOOD UNIT TYPE: NORMAL
BLOOD UNIT TYPE: NORMAL
BUN SERPL-MCNC: 9 MG/DL (ref 6–20)
CALCIUM SERPL-MCNC: 9 MG/DL (ref 8.7–10.5)
CHLORIDE SERPL-SCNC: 108 MMOL/L (ref 95–110)
CO2 SERPL-SCNC: 19 MMOL/L (ref 23–29)
CODING SYSTEM: NORMAL
CODING SYSTEM: NORMAL
CREAT SERPL-MCNC: 0.8 MG/DL (ref 0.5–1.4)
CTP QC/QA: YES
DACRYOCYTES BLD QL SMEAR: ABNORMAL
DIFFERENTIAL METHOD: ABNORMAL
DISPENSE STATUS: NORMAL
DISPENSE STATUS: NORMAL
EOSINOPHIL # BLD AUTO: 0.1 K/UL (ref 0–0.5)
EOSINOPHIL NFR BLD: 1.3 % (ref 0–8)
ERYTHROCYTE [DISTWIDTH] IN BLOOD BY AUTOMATED COUNT: 23.1 % (ref 11.5–14.5)
EST. GFR  (NO RACE VARIABLE): >60 ML/MIN/1.73 M^2
GIANT PLATELETS BLD QL SMEAR: PRESENT
GLUCOSE SERPL-MCNC: 117 MG/DL (ref 70–110)
HCT VFR BLD AUTO: 14.8 % (ref 37–48.5)
HCT VFR BLD AUTO: 21.4 % (ref 37–48.5)
HGB BLD-MCNC: 3.5 G/DL (ref 12–16)
HGB BLD-MCNC: 6.1 G/DL (ref 12–16)
HYPOCHROMIA BLD QL SMEAR: ABNORMAL
IMM GRANULOCYTES # BLD AUTO: 0.02 K/UL (ref 0–0.04)
IMM GRANULOCYTES NFR BLD AUTO: 0.4 % (ref 0–0.5)
IRON SERPL-MCNC: 20 UG/DL (ref 30–160)
LYMPHOCYTES # BLD AUTO: 1.6 K/UL (ref 1–4.8)
LYMPHOCYTES NFR BLD: 29.6 % (ref 18–48)
MCH RBC QN AUTO: 14.2 PG (ref 27–31)
MCHC RBC AUTO-ENTMCNC: 23.6 G/DL (ref 32–36)
MCV RBC AUTO: 60 FL (ref 82–98)
MONOCYTES # BLD AUTO: 0.5 K/UL (ref 0.3–1)
MONOCYTES NFR BLD: 9.1 % (ref 4–15)
NEUTROPHILS # BLD AUTO: 3.2 K/UL (ref 1.8–7.7)
NEUTROPHILS NFR BLD: 59.4 % (ref 38–73)
NRBC BLD-RTO: 0 /100 WBC
NUM UNITS TRANS PACKED RBC: NORMAL
NUM UNITS TRANS PACKED RBC: NORMAL
OVALOCYTES BLD QL SMEAR: ABNORMAL
PLATELET # BLD AUTO: 499 K/UL (ref 150–450)
PLATELET BLD QL SMEAR: ABNORMAL
PMV BLD AUTO: 10.2 FL (ref 9.2–12.9)
POIKILOCYTOSIS BLD QL SMEAR: ABNORMAL
POLYCHROMASIA BLD QL SMEAR: ABNORMAL
POTASSIUM SERPL-SCNC: 3.8 MMOL/L (ref 3.5–5.1)
PROT SERPL-MCNC: 7.4 G/DL (ref 6–8.4)
RBC # BLD AUTO: 2.47 M/UL (ref 4–5.4)
SARS-COV-2 RDRP RESP QL NAA+PROBE: NEGATIVE
SATURATED IRON: 4 % (ref 20–50)
SCHISTOCYTES BLD QL SMEAR: ABNORMAL
SODIUM SERPL-SCNC: 138 MMOL/L (ref 136–145)
TARGETS BLD QL SMEAR: ABNORMAL
TOTAL IRON BINDING CAPACITY: 482 UG/DL (ref 250–450)
TRANSFERRIN SERPL-MCNC: 326 MG/DL (ref 200–375)
TSH SERPL DL<=0.005 MIU/L-ACNC: 1.4 UIU/ML (ref 0.4–4)
WBC # BLD AUTO: 5.38 K/UL (ref 3.9–12.7)

## 2022-09-06 PROCEDURE — 25000003 PHARM REV CODE 250: Performed by: PHYSICIAN ASSISTANT

## 2022-09-06 PROCEDURE — 93010 ELECTROCARDIOGRAM REPORT: CPT | Mod: ,,, | Performed by: INTERNAL MEDICINE

## 2022-09-06 PROCEDURE — 80053 COMPREHEN METABOLIC PANEL: CPT | Performed by: NURSE PRACTITIONER

## 2022-09-06 PROCEDURE — 85014 HEMATOCRIT: CPT | Performed by: NURSE PRACTITIONER

## 2022-09-06 PROCEDURE — 82728 ASSAY OF FERRITIN: CPT | Performed by: NURSE PRACTITIONER

## 2022-09-06 PROCEDURE — A4216 STERILE WATER/SALINE, 10 ML: HCPCS | Performed by: PHYSICIAN ASSISTANT

## 2022-09-06 PROCEDURE — 85018 HEMOGLOBIN: CPT | Performed by: NURSE PRACTITIONER

## 2022-09-06 PROCEDURE — 99285 EMERGENCY DEPT VISIT HI MDM: CPT | Mod: 25

## 2022-09-06 PROCEDURE — 99223 PR INITIAL HOSPITAL CARE,LEVL III: ICD-10-PCS | Mod: ,,, | Performed by: NURSE PRACTITIONER

## 2022-09-06 PROCEDURE — 86920 COMPATIBILITY TEST SPIN: CPT | Performed by: PHYSICIAN ASSISTANT

## 2022-09-06 PROCEDURE — 85025 COMPLETE CBC W/AUTO DIFF WBC: CPT | Performed by: NURSE PRACTITIONER

## 2022-09-06 PROCEDURE — 93005 ELECTROCARDIOGRAM TRACING: CPT

## 2022-09-06 PROCEDURE — 84466 ASSAY OF TRANSFERRIN: CPT | Performed by: NURSE PRACTITIONER

## 2022-09-06 PROCEDURE — 86901 BLOOD TYPING SEROLOGIC RH(D): CPT | Performed by: NURSE PRACTITIONER

## 2022-09-06 PROCEDURE — 36415 COLL VENOUS BLD VENIPUNCTURE: CPT | Performed by: NURSE PRACTITIONER

## 2022-09-06 PROCEDURE — 21400001 HC TELEMETRY ROOM

## 2022-09-06 PROCEDURE — 36430 TRANSFUSION BLD/BLD COMPNT: CPT

## 2022-09-06 PROCEDURE — 86920 COMPATIBILITY TEST SPIN: CPT | Performed by: NURSE PRACTITIONER

## 2022-09-06 PROCEDURE — 84443 ASSAY THYROID STIM HORMONE: CPT | Performed by: NURSE PRACTITIONER

## 2022-09-06 PROCEDURE — P9016 RBC LEUKOCYTES REDUCED: HCPCS | Performed by: PHYSICIAN ASSISTANT

## 2022-09-06 PROCEDURE — U0002 COVID-19 LAB TEST NON-CDC: HCPCS | Performed by: PHYSICIAN ASSISTANT

## 2022-09-06 PROCEDURE — 99223 1ST HOSP IP/OBS HIGH 75: CPT | Mod: ,,, | Performed by: NURSE PRACTITIONER

## 2022-09-06 PROCEDURE — 63600175 PHARM REV CODE 636 W HCPCS: Performed by: PHYSICIAN ASSISTANT

## 2022-09-06 PROCEDURE — 93010 EKG 12-LEAD: ICD-10-PCS | Mod: ,,, | Performed by: INTERNAL MEDICINE

## 2022-09-06 RX ORDER — ONDANSETRON 2 MG/ML
4 INJECTION INTRAMUSCULAR; INTRAVENOUS EVERY 6 HOURS PRN
Status: DISCONTINUED | OUTPATIENT
Start: 2022-09-06 | End: 2022-09-08 | Stop reason: HOSPADM

## 2022-09-06 RX ORDER — DIPHENHYDRAMINE HYDROCHLORIDE 50 MG/ML
25 INJECTION INTRAMUSCULAR; INTRAVENOUS
Status: COMPLETED | OUTPATIENT
Start: 2022-09-06 | End: 2022-09-06

## 2022-09-06 RX ORDER — HYDROCODONE BITARTRATE AND ACETAMINOPHEN 500; 5 MG/1; MG/1
TABLET ORAL
Status: DISCONTINUED | OUTPATIENT
Start: 2022-09-06 | End: 2022-09-08 | Stop reason: HOSPADM

## 2022-09-06 RX ORDER — SODIUM CHLORIDE 0.9 % (FLUSH) 0.9 %
10 SYRINGE (ML) INJECTION DAILY
Status: DISCONTINUED | OUTPATIENT
Start: 2022-09-06 | End: 2022-09-08 | Stop reason: HOSPADM

## 2022-09-06 RX ORDER — POLYETHYLENE GLYCOL 3350 17 G/17G
17 POWDER, FOR SOLUTION ORAL DAILY PRN
Status: DISCONTINUED | OUTPATIENT
Start: 2022-09-06 | End: 2022-09-08 | Stop reason: HOSPADM

## 2022-09-06 RX ORDER — ACETAMINOPHEN 325 MG/1
650 TABLET ORAL EVERY 4 HOURS PRN
Status: DISCONTINUED | OUTPATIENT
Start: 2022-09-06 | End: 2022-09-08 | Stop reason: HOSPADM

## 2022-09-06 RX ADMIN — DIPHENHYDRAMINE HYDROCHLORIDE 25 MG: 50 INJECTION, SOLUTION INTRAMUSCULAR; INTRAVENOUS at 03:09

## 2022-09-06 RX ADMIN — Medication 10 ML: at 06:09

## 2022-09-06 NOTE — H&P
[facility] Emergency Dept  [facility] Medicine  History & Physical    Patient Name: Lea Jarvis  MRN: [id number]  Patient Class: IP- Inpatient  Admission Date: 9/6/2022  Attending Physician: Bang Perez MD   Primary Care Provider: JARETH Dee         Patient information was obtained from patient and ER records.     Subjective:     Principal Problem:Symptomatic anemia    Chief Complaint:   Chief Complaint   Patient presents with    low hgb, weakness     Pt was sent by clinic due to low hgb of 3.5. pt states she has been feeling weak for approx 1 month. Pt has hx of anemia. Pt had transfusion Oct 2020. Pt has not been taking her iron pills.  AAO x 3 nadn skin extremely pale. Conjunctiva extremely pale. Pt c.o mild SOB         HPI: Ms. Tate is a 27 year old female with a PMH of anemia , uterine fibroids, and high cholesterol.  She was sent to the ED from her GYN clinic for a hemoglobin of 3.5. Pt reports intermittent dyspnea on exertion and fatigue. She thought her SOB was due to being out of shape. She denies chest pain, shortness of breath at rest, dizziness, and weakness. Pt denies any acute blood loss, melena, hematuria or abdominal pain. Pt has h/o menorrhagia with usual cycles lasting 7-8 days. Pt believes she is on the last day of her current cycle. She had recent IUD placement but is concerned that it has fallen out. Pt was at her GYN today to evaluate for IUD placement. They ordered a future pelvic ultrasound because they were unable to confirm placement. Pt had a blood transfusion in 2020 for symptomatic anemia with a hgb of 5. She was prescribed iron supplements and the nuva ring. Ms Tate reports that she has not been taking the iron supplements for almost one year. She was having constipation even with colace use. Pt to receive packed RBC transfusion an is admitted to hospital medicine.       Past Medical History:   Diagnosis Date    Anemia     Fibroids        History reviewed. No pertinent  surgical history.    Review of patient's allergies indicates:  No Known Allergies    No current facility-administered medications on file prior to encounter.     Current Outpatient Medications on File Prior to Encounter   Medication Sig    multivitamin with minerals tablet Take 1 tablet by mouth once daily.    [DISCONTINUED] docusate sodium (COLACE) 100 MG capsule Take 1 capsule (100 mg total) by mouth 2 (two) times daily as needed for Constipation.    [DISCONTINUED] etonogestreL-ethinyl estradioL (NUVARING) 0.12-0.015 mg/24 hr vaginal ring INSERT 1 RING VAGINALLY FOR 3 WEEKS THEN REMOVE FOR 1 WEEK. START ON 2ND DAY OF CYCLE    [DISCONTINUED] ferrous sulfate (FEOSOL) 325 mg (65 mg iron) Tab tablet Take 1 tablet (325 mg total) by mouth once daily.     Family History       Problem Relation (Age of Onset)    Colon cancer Paternal Grandfather    Diabetes Paternal Uncle, Paternal Grandfather    Hyperlipidemia Mother, Father          Tobacco Use    Smoking status: Never    Smokeless tobacco: Never   Substance and Sexual Activity    Alcohol use: Yes     Comment: occ    Drug use: Not Currently    Sexual activity: Yes     Partners: Male     Birth control/protection: I.U.D.     Review of Systems   Constitutional:  Positive for fatigue. Negative for activity change, chills and fever.   HENT:  Negative for congestion and trouble swallowing.    Eyes:  Negative for photophobia and visual disturbance.   Respiratory:  Positive for shortness of breath (with exertion). Negative for cough and wheezing.    Cardiovascular:  Negative for chest pain, palpitations and leg swelling.   Gastrointestinal:  Negative for abdominal pain, diarrhea, nausea and vomiting.   Endocrine: Negative for polydipsia and polyuria.   Genitourinary:  Negative for dysuria and frequency.   Musculoskeletal:  Negative for arthralgias and gait problem.   Skin:  Negative for rash and wound.   Neurological:  Negative for dizziness, speech difficulty, weakness  and headaches.   Psychiatric/Behavioral:  Negative for confusion and sleep disturbance.    Objective:     Vital Signs (Most Recent):  Temp: 98.8 °F (37.1 °C) (09/06/22 1506)  Pulse: 105 (09/06/22 1506)  Resp: (!) 22 (09/06/22 1506)  BP: (!) 111/59 (09/06/22 1506)  SpO2: 100 % (09/06/22 1506)   Vital Signs (24h Range):  Temp:  [98.4 °F (36.9 °C)-98.8 °F (37.1 °C)] 98.8 °F (37.1 °C)  Pulse:  [105-124] 105  Resp:  [18-22] 22  SpO2:  [99 %-100 %] 100 %  BP: (111-126)/(58-61) 111/59     Weight: 63.5 kg (140 lb)  Body mass index is 25.61 kg/m².    Physical Exam  Vitals reviewed.   Constitutional:       General: She is not in acute distress.     Appearance: She is not ill-appearing.   HENT:      Head: Normocephalic and atraumatic.      Nose: No congestion.      Mouth/Throat:      Mouth: Mucous membranes are moist.      Pharynx: Oropharynx is clear.   Eyes:      General:         Right eye: No discharge.         Left eye: No discharge.      Extraocular Movements: Extraocular movements intact.      Pupils: Pupils are equal, round, and reactive to light.   Cardiovascular:      Rate and Rhythm: Regular rhythm. Tachycardia present.      Heart sounds: Normal heart sounds. No murmur heard.  Pulmonary:      Effort: Pulmonary effort is normal. No respiratory distress.      Breath sounds: Normal breath sounds. No wheezing or rhonchi.   Abdominal:      General: Bowel sounds are normal. There is no distension.      Palpations: Abdomen is soft.      Tenderness: There is no abdominal tenderness. There is no guarding.   Musculoskeletal:         General: No swelling or tenderness. Normal range of motion.      Cervical back: Normal range of motion. No rigidity or tenderness.   Skin:     General: Skin is warm.      Capillary Refill: Capillary refill takes less than 2 seconds.      Findings: No lesion or rash.   Neurological:      General: No focal deficit present.      Mental Status: She is alert and oriented to person, place, and time.       Sensory: No sensory deficit.      Gait: Gait normal.   Psychiatric:         Mood and Affect: Mood normal.         Behavior: Behavior normal.         CRANIAL NERVES     CN III, IV, VI   Pupils are equal, round, and reactive to light.     Significant Labs: All pertinent labs within the past 24 hours have been reviewed.  CBC:   Recent Labs   Lab 09/06/22  1316   WBC 5.38   HGB 3.5*   HCT 14.8*   *     CMP:   Recent Labs   Lab 09/06/22  1316      K 3.8      CO2 19*   *   BUN 9   CREATININE 0.8   CALCIUM 9.0   PROT 7.4   ALBUMIN 4.0   BILITOT 0.4   ALKPHOS 41*   AST 18   ALT 12   ANIONGAP 11     TSH:   Recent Labs   Lab 09/06/22  1316   TSH 1.402       Significant Imaging: I have reviewed all pertinent imaging results/findings within the past 24 hours.  No image results found.      Assessment/Plan:     * Symptomatic anemia  History of Iron Deficient Anemia and Uterine Fibroids    Pt with fatigue, exertional dyspnea, and tachycardia. Hemoglobin 3.5 and hematocrit 14.8. MCV is 60. TSH 1.402. Pt has a history of menorrhagia and currently on last day of menstrual cycle. Last pelvic ultrasound in 2020 reported myometrial fibroid. No current use of iron supplementation. Pt will receive 2 units of PRBC. Will monitor H/H. Hematology/Oncology consulted for further recommendations, as pt has intolerance to oral iron supplementation.   Pending Labs:  Iron  TIBC  Ferritin         VTE Risk Mitigation (From admission, onward)         Ordered     IP VTE LOW RISK PATIENT  Once         09/06/22 1459     Place sequential compression device  Until discontinued         09/06/22 3651                   Ragini Baltazar DNP  Department of Hospital Medicine   [facility] - Emergency Dept

## 2022-09-06 NOTE — FIRST PROVIDER EVALUATION
"Medical screening exam completed.  I have conducted a focused provider triage encounter, findings are as follows:    Brief history of present illness:  states [facility]  Was told it was "3.5"  Reports weakness x a few months.  Hx of blood transfusion previously.  BRANDYN, not on pills.      Vitals:    09/06/22 1244   BP: 126/61   BP Location: Left arm   Patient Position: Sitting   Pulse: (!) 124   Resp: 18   Temp: 98.4 °F (36.9 °C)   TempSrc: Oral   SpO2: 99%   Weight: 63.5 kg (140 lb)   Height: 5' 2" (1.575 m)       Pertinent physical exam:  patient is pale appearing    Brief workup plan:  labs    Preliminary workup initiated; this workup will be continued and followed by the physician or advanced practice provider that is assigned to the patient when roomed.  "

## 2022-09-06 NOTE — ED TRIAGE NOTES
Patient presents to ED with c/o abnormal lab result of low HH. She reports recent fatigue and SOB on exertion. Denies any n/v. Does report heavy menstrual periods and not taking iron pills the last few months.

## 2022-09-06 NOTE — SUBJECTIVE & OBJECTIVE
Past Medical History:   Diagnosis Date    Anemia     Fibroids        History reviewed. No pertinent surgical history.    Review of patient's allergies indicates:  No Known Allergies    No current facility-administered medications on file prior to encounter.     Current Outpatient Medications on File Prior to Encounter   Medication Sig    multivitamin with minerals tablet Take 1 tablet by mouth once daily.    [DISCONTINUED] docusate sodium (COLACE) 100 MG capsule Take 1 capsule (100 mg total) by mouth 2 (two) times daily as needed for Constipation.    [DISCONTINUED] etonogestreL-ethinyl estradioL (NUVARING) 0.12-0.015 mg/24 hr vaginal ring INSERT 1 RING VAGINALLY FOR 3 WEEKS THEN REMOVE FOR 1 WEEK. START ON 2ND DAY OF CYCLE    [DISCONTINUED] ferrous sulfate (FEOSOL) 325 mg (65 mg iron) Tab tablet Take 1 tablet (325 mg total) by mouth once daily.     Family History       Problem Relation (Age of Onset)    Colon cancer Paternal Grandfather    Diabetes Paternal Uncle, Paternal Grandfather    Hyperlipidemia Mother, Father          Tobacco Use    Smoking status: Never    Smokeless tobacco: Never   Substance and Sexual Activity    Alcohol use: Yes     Comment: occ    Drug use: Not Currently    Sexual activity: Yes     Partners: Male     Birth control/protection: I.U.D.     Review of Systems   Constitutional:  Positive for fatigue. Negative for activity change, chills and fever.   HENT:  Negative for congestion and trouble swallowing.    Eyes:  Negative for photophobia and visual disturbance.   Respiratory:  Positive for shortness of breath (with exertion). Negative for cough and wheezing.    Cardiovascular:  Negative for chest pain, palpitations and leg swelling.   Gastrointestinal:  Negative for abdominal pain, diarrhea, nausea and vomiting.   Endocrine: Negative for polydipsia and polyuria.   Genitourinary:  Negative for dysuria and frequency.   Musculoskeletal:  Negative for arthralgias and gait problem.   Skin:   Negative for rash and wound.   Neurological:  Negative for dizziness, speech difficulty, weakness and headaches.   Psychiatric/Behavioral:  Negative for confusion and sleep disturbance.    Objective:     Vital Signs (Most Recent):  Temp: 98.8 °F (37.1 °C) (09/06/22 1506)  Pulse: 105 (09/06/22 1506)  Resp: (!) 22 (09/06/22 1506)  BP: (!) 111/59 (09/06/22 1506)  SpO2: 100 % (09/06/22 1506)   Vital Signs (24h Range):  Temp:  [98.4 °F (36.9 °C)-98.8 °F (37.1 °C)] 98.8 °F (37.1 °C)  Pulse:  [105-124] 105  Resp:  [18-22] 22  SpO2:  [99 %-100 %] 100 %  BP: (111-126)/(58-61) 111/59     Weight: 63.5 kg (140 lb)  Body mass index is 25.61 kg/m².    Physical Exam  Vitals reviewed.   Constitutional:       General: She is not in acute distress.     Appearance: She is not ill-appearing.   HENT:      Head: Normocephalic and atraumatic.      Nose: No congestion.      Mouth/Throat:      Mouth: Mucous membranes are moist.      Pharynx: Oropharynx is clear.   Eyes:      General:         Right eye: No discharge.         Left eye: No discharge.      Extraocular Movements: Extraocular movements intact.      Pupils: Pupils are equal, round, and reactive to light.   Cardiovascular:      Rate and Rhythm: Regular rhythm. Tachycardia present.      Heart sounds: Normal heart sounds. No murmur heard.  Pulmonary:      Effort: Pulmonary effort is normal. No respiratory distress.      Breath sounds: Normal breath sounds. No wheezing or rhonchi.   Abdominal:      General: Bowel sounds are normal. There is no distension.      Palpations: Abdomen is soft.      Tenderness: There is no abdominal tenderness. There is no guarding.   Musculoskeletal:         General: No swelling or tenderness. Normal range of motion.      Cervical back: Normal range of motion. No rigidity or tenderness.   Skin:     General: Skin is warm.      Capillary Refill: Capillary refill takes less than 2 seconds.      Findings: No lesion or rash.   Neurological:      General: No  focal deficit present.      Mental Status: She is alert and oriented to person, place, and time.      Sensory: No sensory deficit.      Gait: Gait normal.   Psychiatric:         Mood and Affect: Mood normal.         Behavior: Behavior normal.         CRANIAL NERVES     CN III, IV, VI   Pupils are equal, round, and reactive to light.     Significant Labs: All pertinent labs within the past 24 hours have been reviewed.  CBC:   Recent Labs   Lab 09/06/22  1316   WBC 5.38   HGB 3.5*   HCT 14.8*   *     CMP:   Recent Labs   Lab 09/06/22  1316      K 3.8      CO2 19*   *   BUN 9   CREATININE 0.8   CALCIUM 9.0   PROT 7.4   ALBUMIN 4.0   BILITOT 0.4   ALKPHOS 41*   AST 18   ALT 12   ANIONGAP 11     TSH:   Recent Labs   Lab 09/06/22  1316   TSH 1.402       Significant Imaging: I have reviewed all pertinent imaging results/findings within the past 24 hours.  No image results found.

## 2022-09-06 NOTE — ED NOTES
Patient transported via W/C by this RN with blood transfusion infusing. On departure, patient ambulatory, AAOx4, NAD, respirations E/Ul

## 2022-09-06 NOTE — ASSESSMENT & PLAN NOTE
History of Iron Deficient Anemia and Uterine Fibroids    Pt with fatigue, exertional dyspnea, and tachycardia. Hemoglobin 3.5 and hematocrit 14.8. MCV is 60. TSH 1.402. Pt has a history of menorrhagia and currently on last day of menstrual cycle. Last pelvic ultrasound in 2020 reported myometrial fibroid. No current use of iron supplementation. Pt will receive 2 units of PRBC. Will monitor H/H. Hematology/Oncology consulted for further recommendations, as pt has intolerance to oral iron supplementation.   Pending Labs:  Iron  TIBC  Ferritin

## 2022-09-06 NOTE — HPI
"Per Ragini Baltazar DNP:    "Ms. Tate is a 27 year old female with a PMH of anemia , uterine fibroids, and high cholesterol.  She was sent to the ED from her GYN clinic for a hemoglobin of 3.5. Pt reports intermittent dyspnea on exertion and fatigue. She thought her SOB was due to being out of shape. She denies chest pain, shortness of breath at rest, dizziness, and weakness. Pt denies any acute blood loss, melena, hematuria or abdominal pain. Pt has h/o menorrhagia with usual cycles lasting 7-8 days. Pt believes she is on the last day of her current cycle. She had recent IUD placement but is concerned that it has fallen out. Pt was at her GYN today to evaluate for IUD placement. They ordered a future pelvic ultrasound because they were unable to confirm placement. Pt had a blood transfusion in 2020 for symptomatic anemia with a hgb of 5. She was prescribed iron supplements and the nuva ring. Ms Tate reports that she has not been taking the iron supplements for almost one year. She was having constipation even with colace use. Pt to receive packed RBC transfusion an is admitted to hospital medicine."  "

## 2022-09-06 NOTE — PLAN OF CARE
PCP [facility]  Pharmacy 1)Bedside Delivery 2)[facility]  Plans to walk home - denied any anticipated discharge needs       09/06/22 1701   Discharge Assessment   Assessment Type Discharge Planning Assessment   Confirmed/corrected address, phone number and insurance Yes   Confirmed Demographics Correct on Facesheet   Source of Information patient   Lives With alone   Do you expect to return to your current living situation? Yes   Prior to hospitilization cognitive status: Alert/Oriented   Current cognitive status: Alert/Oriented   Walking or Climbing Stairs Difficulty none   Dressing/Bathing Difficulty none   Equipment Currently Used at Home none   Do you take prescription medications? No   Do you have prescription coverage? Yes   Do you have any problems affording any of your prescribed medications? No   Who is going to help you get home at discharge? walk   Discharge Plan A Home   DME Needed Upon Discharge  none   Discharge Plan discussed with: Patient   Discharge Barriers Identified None   Physical Activity   On average, how many days per week do you engage in moderate to strenuous exercise (like a brisk walk)? 0 days   On average, how many minutes do you engage in exercise at this level? 0 min   Financial Resource Strain   How hard is it for you to pay for the very basics like food, housing, medical care, and heating? Not hard   Housing Stability   In the last 12 months, was there a time when you were not able to pay the mortgage or rent on time? N   In the last 12 months, was there a time when you did not have a steady place to sleep or slept in a shelter (including now)? N   Transportation Needs   In the past 12 months, has lack of transportation kept you from medical appointments or from getting medications? no   In the past 12 months, has lack of transportation kept you from meetings, work, or from getting things needed for daily living? No   Food Insecurity   Within the past 12 months,  you worried that your food would run out before you got the money to buy more. Never true   Within the past 12 months, the food you bought just didn't last and you didn't have money to get more. Never true   Stress   Do you feel stress - tense, restless, nervous, or anxious, or unable to sleep at night because your mind is troubled all the time - these days? To some exte   Social Connections   In a typical week, how many times do you talk on the phone with family, friends, or neighbors? More than 3   How often do you get together with friends or relatives? Twice   How often do you attend Yazidism or Caodaism services? Never   Do you belong to any clubs or organizations such as Yazidism groups, unions, fraternal or athletic groups, or school groups? No   How often do you attend meetings of the clubs or organizations you belong to? Never   Are you , , , , never , or living with a partner? Never marrie   Alcohol Use   Q1: How often do you have a drink containing alcohol? 2-4 pr month   Q2: How many drinks containing alcohol do you have on a typical day when you are drinking? 1 or 2   Q3: How often do you have six or more drinks on one occasion? Never

## 2022-09-06 NOTE — ED PROVIDER NOTES
Source of History:  Patient    Chief complaint:  low hgb, weakness (Pt was sent by clinic due to low hgb of 3.5. pt states she has been feeling weak for approx 1 month. Pt has hx of anemia. Pt had transfusion Oct 2020. Pt has not been taking her iron pills.  AAO x 3 nadn skin extremely pale. Conjunctiva extremely pale. Pt c.o mild SOB )      HPI:  Lea Jarvis is a 27 y.o. female presenting to the emergency department with low hemoglobin level.  [facility] and [facility] that [facility] hemoglobin was 3.5.  She states for the past 2 months she has felt fatigued, generally weak, and short winded.  She has history of symptomatic anemia secondary to menorrhagia from uterine fibroids that has required previous blood transfusion.  She is currently on the last day of her menstrual cycle.  She is no longer taking her iron pills.  This is the extent to the patients complaints today here in the emergency department.    ROS: As per HPI and below:  General: No fever.  No chills.  + fatigue and generalized weakness  Eyes: No visual changes.  ENT: No sore throat. No congestion.  Head: No headache.    Respiratory: + shortness of breath.  No cough.  Cardiovascular: + rapid heart rate  Abdomen: No abdominal pain.  No nausea or vomiting.  Genito-Urinary: + history of menorrhagia  Neurologic: No focal weakness.  No numbness.  MSK: no back pain.  Integument: No rashes or lesions.  Allergy/immunology:  Not immunocompromised.     Review of patient's allergies indicates:  No Known Allergies    PMH:  As per HPI and below:  Past Medical History:   Diagnosis Date    Anemia     Fibroids      No past surgical history on file.    Social History     Tobacco Use    Smoking status: Never    Smokeless tobacco: Never   Substance Use Topics    Alcohol use: Yes     Comment: occ    Drug use: Not Currently       Physical Exam:    /61 (BP Location: Left arm, Patient Position: Sitting)   Pulse (!) 124   Temp 98.4 °F (36.9 °C)  "(Oral)   Resp 18   Ht 5' 2" (1.575 m)   Wt 63.5 kg (140 lb)   SpO2 99%   BMI 25.61 kg/m²   Nursing note and vital signs reviewed.  Appearance: No acute distress.  Appears pale.  Nontoxic appearing.  Eyes:  Conjunctival pallor  ENT: Oropharynx clear.    Chest/ Respiratory: Clear to auscultation bilaterally.  Good air movement.  No wheezes.  No rhonchi. No rales. No accessory muscle use.  Cardiovascular:  Tachycardic.  No murmurs. No gallops. No rubs.  Abdomen: Soft.  Not distended.  Nontender.  No guarding.  No rebound. Non-peritoneal.  Musculoskeletal: Good range of motion all joints.  No deformities.  Neck supple.  No meningismus.  Skin: No rashes seen.  Good turgor.  No abrasions.  No ecchymoses.  Neurologic: Motor intact.  Sensation intact.   Mental Status:  Alert and oriented x 3.  Appropriate, conversant.   Labs that have been ordered have been independently reviewed and interpreted by myself.    I decided to obtain the patient's medical records.    MDM/ Differential Dx:    27 y.o. female with history of menorrhagia and anemia who is presenting to emergency department with low hemoglobin level obtain an outside facility.  Patient not currently have the bleeding, on her last day of her menstrual cycle today.  She is afebrile nontoxic appearing.  Initially, patient normotensive and tachycardic.   Will obtain blood consent, plan for transfusion and verify H&H on repeat labs today  ED Course as of 09/06/22 1443   e Sep 06, 2022   1435 EKG interpreted by myself obtained at 1:59 p.m.  Sinus tachycardia at a rate of 113 beats per minute.  No STEMI.  No ischemic changes. [AG]   1442 Patient consent signed for blood transfusion.  2 units packed red blood cells ordered.  Discussed with eustachian management in hospitalist team, will admit to inpatient. [AG]      ED Course User Index  [AG] Guanako Franks PA-C             Diagnostic Impression:    1. Symptomatic anemia    2. Tachycardia    3. History of " menorrhagia                   Guanako Franks PA-C  09/06/22 5109

## 2022-09-07 DIAGNOSIS — D50.0 IRON DEFICIENCY ANEMIA DUE TO CHRONIC BLOOD LOSS: ICD-10-CM

## 2022-09-07 PROBLEM — N92.0 MENORRHAGIA: Status: ACTIVE | Noted: 2022-09-07

## 2022-09-07 LAB
ANION GAP SERPL CALC-SCNC: 8 MMOL/L (ref 8–16)
ANISOCYTOSIS BLD QL SMEAR: SLIGHT
BASOPHILS # BLD AUTO: 0.04 K/UL (ref 0–0.2)
BASOPHILS NFR BLD: 0.8 % (ref 0–1.9)
BLD PROD TYP BPU: NORMAL
BLOOD UNIT EXPIRATION DATE: NORMAL
BLOOD UNIT TYPE CODE: 5100
BLOOD UNIT TYPE: NORMAL
BUN SERPL-MCNC: 10 MG/DL (ref 6–20)
CALCIUM SERPL-MCNC: 8.6 MG/DL (ref 8.7–10.5)
CHLORIDE SERPL-SCNC: 112 MMOL/L (ref 95–110)
CO2 SERPL-SCNC: 19 MMOL/L (ref 23–29)
CODING SYSTEM: NORMAL
CREAT SERPL-MCNC: 0.7 MG/DL (ref 0.5–1.4)
DACRYOCYTES BLD QL SMEAR: ABNORMAL
DIFFERENTIAL METHOD: ABNORMAL
DISPENSE STATUS: NORMAL
EOSINOPHIL # BLD AUTO: 0.1 K/UL (ref 0–0.5)
EOSINOPHIL NFR BLD: 2.1 % (ref 0–8)
ERYTHROCYTE [DISTWIDTH] IN BLOOD BY AUTOMATED COUNT: 29.7 % (ref 11.5–14.5)
EST. GFR  (NO RACE VARIABLE): >60 ML/MIN/1.73 M^2
FERRITIN SERPL-MCNC: <2 NG/ML (ref 20–300)
GLUCOSE SERPL-MCNC: 97 MG/DL (ref 70–110)
HCT VFR BLD AUTO: 21.2 % (ref 37–48.5)
HGB BLD-MCNC: 5.9 G/DL (ref 12–16)
HYPOCHROMIA BLD QL SMEAR: ABNORMAL
IMM GRANULOCYTES # BLD AUTO: 0.01 K/UL (ref 0–0.04)
IMM GRANULOCYTES NFR BLD AUTO: 0.2 % (ref 0–0.5)
LYMPHOCYTES # BLD AUTO: 2.2 K/UL (ref 1–4.8)
LYMPHOCYTES NFR BLD: 42.4 % (ref 18–48)
MCH RBC QN AUTO: 19 PG (ref 27–31)
MCHC RBC AUTO-ENTMCNC: 27.8 G/DL (ref 32–36)
MCV RBC AUTO: 68 FL (ref 82–98)
MONOCYTES # BLD AUTO: 0.6 K/UL (ref 0.3–1)
MONOCYTES NFR BLD: 11.1 % (ref 4–15)
NEUTROPHILS # BLD AUTO: 2.2 K/UL (ref 1.8–7.7)
NEUTROPHILS NFR BLD: 43.4 % (ref 38–73)
NRBC BLD-RTO: 0 /100 WBC
NUM UNITS TRANS PACKED RBC: NORMAL
OVALOCYTES BLD QL SMEAR: ABNORMAL
PLATELET # BLD AUTO: 436 K/UL (ref 150–450)
PLATELET BLD QL SMEAR: ABNORMAL
PMV BLD AUTO: 10.3 FL (ref 9.2–12.9)
POIKILOCYTOSIS BLD QL SMEAR: ABNORMAL
POLYCHROMASIA BLD QL SMEAR: ABNORMAL
POTASSIUM SERPL-SCNC: 4.3 MMOL/L (ref 3.5–5.1)
RBC # BLD AUTO: 3.1 M/UL (ref 4–5.4)
SODIUM SERPL-SCNC: 139 MMOL/L (ref 136–145)
TARGETS BLD QL SMEAR: ABNORMAL
WBC # BLD AUTO: 5.12 K/UL (ref 3.9–12.7)

## 2022-09-07 PROCEDURE — 85025 COMPLETE CBC W/AUTO DIFF WBC: CPT | Performed by: NURSE PRACTITIONER

## 2022-09-07 PROCEDURE — 99233 PR SUBSEQUENT HOSPITAL CARE,LEVL III: ICD-10-PCS | Mod: ,,, | Performed by: HOSPITALIST

## 2022-09-07 PROCEDURE — 99222 1ST HOSP IP/OBS MODERATE 55: CPT | Mod: ,,, | Performed by: STUDENT IN AN ORGANIZED HEALTH CARE EDUCATION/TRAINING PROGRAM

## 2022-09-07 PROCEDURE — 36415 COLL VENOUS BLD VENIPUNCTURE: CPT | Performed by: NURSE PRACTITIONER

## 2022-09-07 PROCEDURE — 25000003 PHARM REV CODE 250: Performed by: STUDENT IN AN ORGANIZED HEALTH CARE EDUCATION/TRAINING PROGRAM

## 2022-09-07 PROCEDURE — A4216 STERILE WATER/SALINE, 10 ML: HCPCS | Performed by: PHYSICIAN ASSISTANT

## 2022-09-07 PROCEDURE — 63600175 PHARM REV CODE 636 W HCPCS: Performed by: HOSPITALIST

## 2022-09-07 PROCEDURE — P9016 RBC LEUKOCYTES REDUCED: HCPCS | Performed by: NURSE PRACTITIONER

## 2022-09-07 PROCEDURE — 25000003 PHARM REV CODE 250: Performed by: PHYSICIAN ASSISTANT

## 2022-09-07 PROCEDURE — 99222 PR INITIAL HOSPITAL CARE,LEVL II: ICD-10-PCS | Mod: ,,, | Performed by: STUDENT IN AN ORGANIZED HEALTH CARE EDUCATION/TRAINING PROGRAM

## 2022-09-07 PROCEDURE — 63600175 PHARM REV CODE 636 W HCPCS: Performed by: STUDENT IN AN ORGANIZED HEALTH CARE EDUCATION/TRAINING PROGRAM

## 2022-09-07 PROCEDURE — 11000001 HC ACUTE MED/SURG PRIVATE ROOM

## 2022-09-07 PROCEDURE — 99233 SBSQ HOSP IP/OBS HIGH 50: CPT | Mod: ,,, | Performed by: HOSPITALIST

## 2022-09-07 PROCEDURE — 80048 BASIC METABOLIC PNL TOTAL CA: CPT | Performed by: NURSE PRACTITIONER

## 2022-09-07 RX ORDER — DIPHENHYDRAMINE HYDROCHLORIDE 50 MG/ML
25 INJECTION INTRAMUSCULAR; INTRAVENOUS ONCE
Status: COMPLETED | OUTPATIENT
Start: 2022-09-07 | End: 2022-09-07

## 2022-09-07 RX ORDER — HYDROCODONE BITARTRATE AND ACETAMINOPHEN 500; 5 MG/1; MG/1
TABLET ORAL
Status: DISCONTINUED | OUTPATIENT
Start: 2022-09-07 | End: 2022-09-08 | Stop reason: HOSPADM

## 2022-09-07 RX ADMIN — DIPHENHYDRAMINE HYDROCHLORIDE 25 MG: 50 INJECTION, SOLUTION INTRAMUSCULAR; INTRAVENOUS at 07:09

## 2022-09-07 RX ADMIN — Medication 10 ML: at 09:09

## 2022-09-07 RX ADMIN — IRON SUCROSE 300 MG: 20 INJECTION, SOLUTION INTRAVENOUS at 11:09

## 2022-09-07 NOTE — CONSULTS
Heme/Onc consult Note      History of Present Illness:  Patient is a 27 y.o. female with pmhx of BRANDYN due to chronic blood loss/ HMB/ fibroids who presented with symptomatic anemia. She has been given total 3 unit pRBC so far with appropriate response.   She was on her menstrual period which ended on 9/6/22.      She was seen in ER in 10/2020 with hb in 5s and given blood transfusions.   She has been prescribed iron pills in past which she has not been able to tolerate due to gi side effects.   SUBJECTIVE:     Pt was seen and examined at bedside. She reported to be feeling better post transfusions.     Past Medical History:   Diagnosis Date    Anemia     Fibroids      History reviewed. No pertinent surgical history.  Family History   Problem Relation Age of Onset    Hyperlipidemia Mother     Hyperlipidemia Father     Diabetes Paternal Uncle     Colon cancer Paternal Grandfather     Diabetes Paternal Grandfather      Social History     Tobacco Use    Smoking status: Never    Smokeless tobacco: Never   Substance Use Topics    Alcohol use: Yes     Comment: occ    Drug use: Not Currently       Review of patient's allergies indicates:  No Known Allergies     Review of Systems:  Review of Systems   Constitutional:  Positive for malaise/fatigue. Negative for chills, diaphoresis, fever and weight loss.   HENT: Negative.  Negative for congestion, hearing loss, nosebleeds, sore throat and tinnitus.    Eyes: Negative.  Negative for blurred vision and discharge.   Respiratory:  Negative for cough, hemoptysis, sputum production, shortness of breath and wheezing.    Cardiovascular: Negative.  Negative for chest pain, palpitations and leg swelling.   Gastrointestinal: Negative.  Negative for abdominal pain, blood in stool, constipation, diarrhea, heartburn, melena, nausea and vomiting.   Genitourinary: Negative.    Musculoskeletal: Negative.  Negative for back pain, falls, joint pain and myalgias.   Skin: Negative.  Negative for  itching and rash.   Neurological: Negative.  Negative for dizziness, tingling, sensory change, speech change, focal weakness, seizures, loss of consciousness, weakness and headaches.   Endo/Heme/Allergies: Negative.  Does not bruise/bleed easily.   Psychiatric/Behavioral: Negative.  Negative for depression. The patient is not nervous/anxious and does not have insomnia.      OBJECTIVE:     Vital Signs (Most Recent)  Temp: 98.7 °F (37.1 °C) (09/07/22 1235)  Pulse: 77 (09/07/22 1235)  Resp: 16 (09/07/22 1235)  BP: 103/66 (09/07/22 1235)  SpO2: 100 % (09/07/22 1235)    Vital Signs Range (Last 24H):  Temp:  [96.4 °F (35.8 °C)-99 °F (37.2 °C)]   Pulse:  []   Resp:  [16-20]   BP: (103-117)/(51-66)   SpO2:  [99 %-100 %]     Physical Exam:  Physical Exam  Vitals and nursing note reviewed.   Constitutional:       General: She is not in acute distress.     Appearance: Normal appearance. She is not ill-appearing.   HENT:      Head: Normocephalic and atraumatic.      Right Ear: External ear normal.      Left Ear: External ear normal.      Mouth/Throat:      Pharynx: No oropharyngeal exudate.   Eyes:      General: No scleral icterus.        Right eye: No discharge.         Left eye: No discharge.   Cardiovascular:      Rate and Rhythm: Normal rate.   Pulmonary:      Effort: Pulmonary effort is normal. No respiratory distress.   Abdominal:      General: There is no distension.   Musculoskeletal:         General: No swelling or deformity.      Cervical back: Normal range of motion and neck supple.      Right lower leg: No edema.      Left lower leg: No edema.   Skin:     Coloration: Skin is not jaundiced.      Findings: No bruising, erythema, lesion or rash.   Neurological:      General: No focal deficit present.      Mental Status: She is alert and oriented to person, place, and time. Mental status is at baseline.      Coordination: Coordination normal.      Gait: Gait normal.   Psychiatric:         Mood and Affect: Mood  normal.         Behavior: Behavior normal.       Scheduled Meds:   sodium chloride 0.9%  10 mL Intravenous Daily     Continuous Infusions:  PRN Meds:.sodium chloride, sodium chloride, acetaminophen, ondansetron, polyethylene glycol    Laboratory:  I have reviewed all pertinent lab results within the past 24 hours.  CBC:   Recent Labs   Lab 09/07/22  0431   WBC 5.12   RBC 3.10*   HGB 5.9*   HCT 21.2*      MCV 68*   MCH 19.0*   MCHC 27.8*     BMP:   Recent Labs   Lab 09/07/22  0431   GLU 97      K 4.3   *   CO2 19*   BUN 10   CREATININE 0.7   CALCIUM 8.6*     CMP:   Recent Labs   Lab 09/06/22  1316 09/07/22  0431   * 97   CALCIUM 9.0 8.6*   ALBUMIN 4.0  --    PROT 7.4  --     139   K 3.8 4.3   CO2 19* 19*    112*   BUN 9 10   CREATININE 0.8 0.7   ALKPHOS 41*  --    ALT 12  --    AST 18  --    BILITOT 0.4  --      Iron studies reviewed      Diagnostic Results:  Labs: Reviewed  X-Ray: Reviewed  US: Reviewed    ASSESSMENT/PLAN:     Active Hospital Problems    Diagnosis  POA    *Symptomatic anemia [D64.9]  Yes    Menorrhagia [N92.0]  Yes      Resolved Hospital Problems   No resolved problems to display.       1. Symptomatic anemia    2. Tachycardia    3. History of menorrhagia    4. Chest pain    5. Iron deficiency anemia due to chronic blood loss      BRANDYN due to chronic blood loss/ HMB/ fibroids   S/p 3 units pRBC , will follow hb in AM   One dose of venofer ordered for 9/7/22   Pt will be seen in heme clinic OP to complete additional iron infusions.   Continue f/u with obgyn OP for management of HMB.     Will sign off. Please call us should questions arise.     Electronically signed by Lorna Mariee    [facility]

## 2022-09-07 NOTE — CONSULTS
Consult Note  Gynecology      SUBJECTIVE:     History of Present Illness:  Patient is a 27 y.o. G0 with h/o menorrhagia and uterine fibroids currently being evaluated for symptomatic anemia. The patient reports that she was at [facility] yesterday for IUD string check when labs revealed a critically low H/H. The patient was prompted to present to the ED for evaluation and blood transfusion. She reports that she had the IUD placed in July for management of her bleeding without relief. Her cycles last 7-9 days and her heaviest days of bleeding are days 2-6. She reports using about 6 overnight pads on her heaviest days of bleeding. She endorses shortness of breath, fatigue, and ocassional lightheadedness since January. Her LMP ended yesterday. Denies current vaginal bleeding.  She has previously required a blood transfusion for symptomatic anemia with a hemoglobin of 5 in 2020. She has been prescribed oral iron therapy, but has not been taking due to constipation.  She states she does not have established care with Gyn.  She is not currently sexually active. She desires future fertility.  Reports her mother also had fibroids and she ultimately had a hysterectomy.        PMHx:   Past Medical History:   Diagnosis Date    Anemia     Fibroids        PSHx: History reviewed. No pertinent surgical history.    All: Review of patient's allergies indicates:  No Known Allergies    Meds:   Medications Prior to Admission   Medication Sig Dispense Refill Last Dose    multivitamin with minerals tablet Take 1 tablet by mouth once daily.   9/6/2022       SH:   Social History     Socioeconomic History    Marital status: Single   Tobacco Use    Smoking status: Never    Smokeless tobacco: Never   Substance and Sexual Activity    Alcohol use: Yes     Comment: occ    Drug use: Not Currently    Sexual activity: Yes     Partners: Male     Birth control/protection: I.U.D.     Social Determinants of Health     Financial Resource Strain:  Low Risk     Difficulty of Paying Living Expenses: Not hard at all   Food Insecurity: No Food Insecurity    Worried About Running Out of Food in the Last Year: Never true    Ran Out of Food in the Last Year: Never true   Transportation Needs: No Transportation Needs    Lack of Transportation (Medical): No    Lack of Transportation (Non-Medical): No   Physical Activity: Inactive    Days of Exercise per Week: 0 days    Minutes of Exercise per Session: 0 min   Stress: Stress Concern Present    Feeling of Stress : To some extent   Social Connections: Socially Isolated    Frequency of Communication with Friends and Family: More than three times a week    Frequency of Social Gatherings with Friends and Family: Twice a week    Attends [employer] Services: Never    Active Member of Clubs or Organizations: No    Attends Club or Organization Meetings: Never    Marital Status: Never    Housing Stability: Unknown    Unable to Pay for Housing in the Last Year: No    Unstable Housing in the Last Year: No       FH:   Family History   Problem Relation Age of Onset    Hyperlipidemia Mother     Hyperlipidemia Father     Diabetes Paternal Uncle     Colon cancer Paternal Grandfather     Diabetes Paternal Grandfather        Review of Systems:  Constitutional: no fever or chills  Respiratory: + shortness of breath, no cough  Cardiovascular: no chest pain or palpitations  Gastrointestinal: no nausea or vomiting, tolerating diet, negative for change in bowel habits  Genitourinary: no hematuria or dysuria, negative for urinary incontinence  Integument/Breast: no rash or pruritis, negative for breast lump, nipple discharge and skin lesion(s)  Hematologic/Lymphatic: no easy bruising or lymphadenopathy     OBJECTIVE:     Temp:  [96.4 °F (35.8 °C)-99 °F (37.2 °C)] 96.4 °F (35.8 °C)  Pulse:  [] 92  Resp:  [16-22] 18  SpO2:  [99 %-100 %] 99 %  BP: (103-139)/(51-67) 115/55    Recent Results (from the past 24 hour(s))   Comprehensive  metabolic panel    Collection Time: 09/06/22  1:16 PM   Result Value Ref Range    Sodium 138 136 - 145 mmol/L    Potassium 3.8 3.5 - 5.1 mmol/L    Chloride 108 95 - 110 mmol/L    CO2 19 (L) 23 - 29 mmol/L    Glucose 117 (H) 70 - 110 mg/dL    BUN 9 6 - 20 mg/dL    Creatinine 0.8 0.5 - 1.4 mg/dL    Calcium 9.0 8.7 - 10.5 mg/dL    Total Protein 7.4 6.0 - 8.4 g/dL    Albumin 4.0 3.5 - 5.2 g/dL    Total Bilirubin 0.4 0.1 - 1.0 mg/dL    Alkaline Phosphatase 41 (L) 55 - 135 U/L    AST 18 10 - 40 U/L    ALT 12 10 - 44 U/L    Anion Gap 11 8 - 16 mmol/L    eGFR >60 >60 mL/min/1.73 m^2   Type & Screen    Collection Time: 09/06/22  1:16 PM   Result Value Ref Range    Group & Rh O POS     Indirect Mary Kay NEG    CBC Auto Differential    Collection Time: 09/06/22  1:16 PM   Result Value Ref Range    WBC 5.38 3.90 - 12.70 K/uL    RBC 2.47 (L) 4.00 - 5.40 M/uL    Hemoglobin 3.5 (LL) 12.0 - 16.0 g/dL    Hematocrit 14.8 (LL) 37.0 - 48.5 %    MCV 60 (L) 82 - 98 fL    MCH 14.2 (L) 27.0 - 31.0 pg    MCHC 23.6 (L) 32.0 - 36.0 g/dL    RDW 23.1 (H) 11.5 - 14.5 %    Platelets 499 (H) 150 - 450 K/uL    MPV 10.2 9.2 - 12.9 fL    Immature Granulocytes 0.4 0.0 - 0.5 %    Gran # (ANC) 3.2 1.8 - 7.7 K/uL    Immature Grans (Abs) 0.02 0.00 - 0.04 K/uL    Lymph # 1.6 1.0 - 4.8 K/uL    Mono # 0.5 0.3 - 1.0 K/uL    Eos # 0.1 0.0 - 0.5 K/uL    Baso # 0.01 0.00 - 0.20 K/uL    nRBC 0 0 /100 WBC    Gran % 59.4 38.0 - 73.0 %    Lymph % 29.6 18.0 - 48.0 %    Mono % 9.1 4.0 - 15.0 %    Eosinophil % 1.3 0.0 - 8.0 %    Basophil % 0.2 0.0 - 1.9 %    Platelet Estimate Appears normal     Aniso Slight     Poik Moderate     Poly Moderate     Hypo Marked     Ovalocytes Occasional     Target Cells Marked     Tear Drop Cells Occasional     Large/Giant Platelets Present     Fragmented Cells Occasional     Differential Method Automated    Prepare RBC 2 Units; Symptomatic anemia    Collection Time: 09/06/22  1:16 PM   Result Value Ref Range    UNIT NUMBER [id number]      Product Code T5469N46     DISPENSE STATUS TRANSFUSED     CODING SYSTEM WZIU312     Unit Blood Type Code 5100     Unit Blood Type O POS     Unit Expiration [id number]     UNIT NUMBER [id number]     Product Code O9863C87     DISPENSE STATUS TRANSFUSED     CODING SYSTEM YFGK284     Unit Blood Type Code 5100     Unit Blood Type O POS     Unit Expiration [id number]    TSH    Collection Time: 09/06/22  1:16 PM   Result Value Ref Range    TSH 1.402 0.400 - 4.000 uIU/mL   Prepare RBC 1 Unit    Collection Time: 09/06/22  1:16 PM   Result Value Ref Range    UNIT NUMBER [id number]     Product Code B1699F12     DISPENSE STATUS ISSUED     CODING SYSTEM DIPJ828     Unit Blood Type Code 5100     Unit Blood Type O POS     Unit Expiration [id number]    POCT COVID-19 Rapid Screening    Collection Time: 09/06/22  2:24 PM   Result Value Ref Range    POC Rapid COVID Negative Negative     Acceptable Yes    Iron and TIBC    Collection Time: 09/06/22  5:25 PM   Result Value Ref Range    Iron 20 (L) 30 - 160 ug/dL    Transferrin 326 200 - 375 mg/dL    TIBC 482 (H) 250 - 450 ug/dL    Saturated Iron 4 (L) 20 - 50 %   Ferritin    Collection Time: 09/06/22  5:25 PM   Result Value Ref Range    Ferritin <2 (L) 20.0 - 300.0 ng/mL   Hemoglobin    Collection Time: 09/06/22 10:08 PM   Result Value Ref Range    Hemoglobin 6.1 (L) 12.0 - 16.0 g/dL   Hematocrit    Collection Time: 09/06/22 10:08 PM   Result Value Ref Range    Hematocrit 21.4 (L) 37.0 - 48.5 %   Basic Metabolic Panel (BMP)    Collection Time: 09/07/22  4:31 AM   Result Value Ref Range    Sodium 139 136 - 145 mmol/L    Potassium 4.3 3.5 - 5.1 mmol/L    Chloride 112 (H) 95 - 110 mmol/L    CO2 19 (L) 23 - 29 mmol/L    Glucose 97 70 - 110 mg/dL    BUN 10 6 - 20 mg/dL    Creatinine 0.7 0.5 - 1.4 mg/dL    Calcium 8.6 (L) 8.7 - 10.5 mg/dL    Anion Gap 8 8 - 16 mmol/L    eGFR >60 >60 mL/min/1.73 m^2   CBC with Automated Differential    Collection Time: 09/07/22   4:31 AM   Result Value Ref Range    WBC 5.12 3.90 - 12.70 K/uL    RBC 3.10 (L) 4.00 - 5.40 M/uL    Hemoglobin 5.9 (LL) 12.0 - 16.0 g/dL    Hematocrit 21.2 (L) 37.0 - 48.5 %    MCV 68 (L) 82 - 98 fL    MCH 19.0 (L) 27.0 - 31.0 pg    MCHC 27.8 (L) 32.0 - 36.0 g/dL    RDW 29.7 (H) 11.5 - 14.5 %    Platelets 436 150 - 450 K/uL    MPV 10.3 9.2 - 12.9 fL    Immature Granulocytes 0.2 0.0 - 0.5 %    Gran # (ANC) 2.2 1.8 - 7.7 K/uL    Immature Grans (Abs) 0.01 0.00 - 0.04 K/uL    Lymph # 2.2 1.0 - 4.8 K/uL    Mono # 0.6 0.3 - 1.0 K/uL    Eos # 0.1 0.0 - 0.5 K/uL    Baso # 0.04 0.00 - 0.20 K/uL    nRBC 0 0 /100 WBC    Gran % 43.4 38.0 - 73.0 %    Lymph % 42.4 18.0 - 48.0 %    Mono % 11.1 4.0 - 15.0 %    Eosinophil % 2.1 0.0 - 8.0 %    Basophil % 0.8 0.0 - 1.9 %    Platelet Estimate Appears normal     Aniso Slight     Poik Moderate     Poly Occasional     Hypo Moderate     Ovalocytes Occasional     Target Cells Occasional     Tear Drop Cells Occasional     Differential Method Automated      Recent Labs   Lab 09/06/22  1316 09/06/22  2208 09/07/22  0431   WBC 5.38  --  5.12   HGB 3.5* 6.1* 5.9*   HCT 14.8* 21.4* 21.2*   MCV 60*  --  68*   *  --  436           Physical Exam:  GEN: No apparent distress. Alert and oriented.   NECK: No thyroid tenderness, no palpable masses or nodules.  CV: Regular rate and rhythm.   LUNGS: Normal work of breathing. No distress.  ABDOMEN: Soft, non tender, non-distended. Good bowel sounds. No guarding or rebound tenderness.  EXT: No erythema, no edema  : exam deferred    Electronically signed by: Francia Todd  Date:                                            09/07/2022  Time:                                           11:52  Signed by Francia Todd MD on 9/7/2022 11:55  Narrative & Impression  EXAMINATION:  US PELVIS COMP WITH TRANSVAG NON-OB (XPD)     CLINICAL HISTORY:  Assess status of IUD, fibroids;     TECHNIQUE:  Transabdominal sonography of the pelvis was performed, followed by  transvaginal sonography to better evaluate the uterus and ovaries.     COMPARISON:  None.     FINDINGS:  Uterus:     Size: 11.7 x 8.2 x 9.6 cm     At least 3 intramural leiomyomas measure up to 4.1 cm.     Leiomyoma at the cervix measures 3.2 cm.     Endometrium obscured.  IUD not visualized.     Right ovary:     Size: 2.7 x 3.6 x 2.5 cm     Left ovary:     Size: 3.2 x 1.9 x 3 cm     Free Fluid:     None.     Impression:  Uterine and cervical leiomyomas as above.  Endometrium obscured.  IUD not visualized.      ASSESSMENT/PLAN:     Assessment:    Lea Jarvis is a 28 yo G0 admitted for symptomatic anemia.    Plan:  AUB-L  - Patient is now s/p 3u pRBC with increase in H/H from 3.5/14.8 > 5.9/21.2  - Pending repeat CBC after third unit  - No vaginal bleeding at this time  - TVUS reveals multiple intramural fibroids, largest measuring 4 cm, IUD not visualized  - Would recommend abdominal and pelvic xray to assess for IUD location, however expulsion of device possible with reported heavy bleeding  - Briefly discussed medical and surgical management of AUB, but patient to follow up in Gyn clinic to discuss further management    Will sign off pending xray images. Please consult with any questions.    Erika Tran M.D.  OB/GYN PGY-3  Pager: [phone]    Discussed plan with staff who was in agreement.

## 2022-09-07 NOTE — HOSPITAL COURSE
Patient is a 27 year woman with history of iron-deficient anemia attributed to menorrhagia with uterine fibroids admitted the hospital with symptomatic severe anemia with initial hemoglobin of 3.5 grams/deciliter following recent heavy menstrual bleeding.  Patient denies any change in bowel habits or blood in her stools.  Patient transfused 2 units of packed red blood cells on admission.  Hemoglobin following transfusion improved to 5.9 grams/deciliter.  Patient transfused an additional (3rd unit of packed red blood cells).  Hemoglobin 7.4 g/dL today and with resolution of significant symptoms of anemia.  Menstrual bleeding resolved.  Patient also transfused intranvous iron to help replenish iron stores.  Patient reports recent heavy menstrual bleeding following placement of intrauterine device placement.  Transvaginal ultrasound reveal fibroids in the uterus and cervix without visualizing IUD. Follow-up plain film also negative for IUD. Suspect intrauterine device may have been expelled.  Patient counseled on the results of testing.  Patient discharged home with outpatient follow-up with close follow-up with gynecology for treatment of abnormal uterine bleeding to decrease risk of further episodes of severe anemia. Patient instructed to take oral iron replacement with stool softener.

## 2022-09-07 NOTE — PROGRESS NOTES
"[facility] Medicine  Progress Note    Patient Name: Lea Jarvis  MRN: [id number]  Patient Class: IP- Inpatient   Admission Date: 9/6/2022  Length of Stay: 1 days  Attending Physician: Jesús Manrique MD        Subjective:     Principal Problem:Symptomatic anemia        HPI:  Per Ragini Baltazar DNP:    "Ms. Tate is a 27 year old female with a PMH of anemia , uterine fibroids, and high cholesterol.  She was sent to the ED from her GYN clinic for a hemoglobin of 3.5. Pt reports intermittent dyspnea on exertion and fatigue. She thought her SOB was due to being out of shape. She denies chest pain, shortness of breath at rest, dizziness, and weakness. Pt denies any acute blood loss, melena, hematuria or abdominal pain. Pt has h/o menorrhagia with usual cycles lasting 7-8 days. Pt believes she is on the last day of her current cycle. She had recent IUD placement but is concerned that it has fallen out. Pt was at her GYN today to evaluate for IUD placement. They ordered a future pelvic ultrasound because they were unable to confirm placement. Pt had a blood transfusion in 2020 for symptomatic anemia with a hgb of 5. She was prescribed iron supplements and the nuva ring. Ms Tate reports that she has not been taking the iron supplements for almost one year. She was having constipation even with colace use. Pt to receive packed RBC transfusion an is admitted to hospital medicine."      Overview/Hospital Course:  Patient is a 27 year woman with history of iron-deficient anemia attributed to menorrhagia with uterine fibroids admitted the hospital with symptomatic severe anemia with initial hemoglobin of 3.5 grams/deciliter.  Patient transfused 2 units of packed red blood cells overnight.  Hemoglobin this morning 5.9 grams/deciliter.  Transfusion additional unit packed red blood cells now.  Consult Gynecology service for evaluation and recommendation regarding management of " menorrhagia.      Interval History: Patient reports feeling better following blood transfusion.    Review of Systems   Constitutional:  Positive for fatigue. Negative for chills and fever.   Respiratory:  Negative for shortness of breath.    Cardiovascular:  Negative for chest pain.   Gastrointestinal:  Negative for abdominal pain, nausea and vomiting.   Genitourinary:  Negative for dysuria and frequency.   Neurological:  Positive for light-headedness.     Objective:     Vital Signs (Most Recent):  Temp: 96.4 °F (35.8 °C) (09/07/22 1045)  Pulse: 92 (09/07/22 1045)  Resp: 18 (09/07/22 1045)  BP: (!) 115/55 (09/07/22 1045)  SpO2: 99 % (09/07/22 1045)   Vital Signs (24h Range):  Temp:  [96.4 °F (35.8 °C)-99 °F (37.2 °C)] 96.4 °F (35.8 °C)  Pulse:  [] 92  Resp:  [16-22] 18  SpO2:  [99 %-100 %] 99 %  BP: (103-139)/(51-67) 115/55     Weight: 63.5 kg (140 lb)  Body mass index is 25.61 kg/m².    Intake/Output Summary (Last 24 hours) at 9/7/2022 1224  Last data filed at 9/7/2022 1045  Gross per 24 hour   Intake 1235 ml   Output --   Net 1235 ml      Physical Exam  Constitutional:       General: She is not in acute distress.  HENT:      Head: Atraumatic.   Eyes:      Comments: Pale conjunctiva   Cardiovascular:      Rate and Rhythm: Normal rate and regular rhythm.      Heart sounds: Normal heart sounds. No murmur heard.  Pulmonary:      Effort: Pulmonary effort is normal.      Breath sounds: Normal breath sounds. No wheezing.   Abdominal:      General: Bowel sounds are normal. There is no distension.      Palpations: Abdomen is soft.      Tenderness: There is no abdominal tenderness.   Musculoskeletal:         General: No deformity. Normal range of motion.      Cervical back: Neck supple.   Neurological:      Mental Status: She is alert and oriented to person, place, and time.       Significant Labs: All pertinent labs within the past 24 hours have been reviewed.    Significant Imaging: I have reviewed all pertinent  imaging results/findings within the past 24 hours.      Assessment/Plan:      * Symptomatic anemia  History of iron deficiency anemia with menorrhagia and uterine fibroids.  Transfuse additional unit packed red blood cells now.  Intravenous iron ordered.  Will check transvaginal ultrasound to assess status of IUD.  Consult Gynecology for evaluation further treatment recommendations.    Menorrhagia  Patient now with 2nd hospitalization for management of severe anemia associated with ongoing menorrhagia.  Consult Gynecology for evaluation treatment related treatment recommendations.      VTE Risk Mitigation (From admission, onward)         Ordered     IP VTE LOW RISK PATIENT  Once         09/06/22 1459     Place sequential compression device  Until discontinued         09/06/22 1459                Jesús Manrique MD  Department of Hospital Medicine   [facility] Med [facility] ([place])

## 2022-09-07 NOTE — ASSESSMENT & PLAN NOTE
History of iron deficiency anemia with menorrhagia and uterine fibroids.  Transfuse additional unit packed red blood cells now.  Intravenous iron ordered.  Will check transvaginal ultrasound to assess status of IUD.  Consult Gynecology for evaluation further treatment recommendations.

## 2022-09-07 NOTE — ASSESSMENT & PLAN NOTE
Patient now with 2nd hospitalization for management of severe anemia associated with ongoing menorrhagia.  Consult Gynecology for evaluation treatment related treatment recommendations.

## 2022-09-07 NOTE — SUBJECTIVE & OBJECTIVE
Interval History: Patient reports feeling better following blood transfusion.    Review of Systems   Constitutional:  Positive for fatigue. Negative for chills and fever.   Respiratory:  Negative for shortness of breath.    Cardiovascular:  Negative for chest pain.   Gastrointestinal:  Negative for abdominal pain, nausea and vomiting.   Genitourinary:  Negative for dysuria and frequency.   Neurological:  Positive for light-headedness.     Objective:     Vital Signs (Most Recent):  Temp: 96.4 °F (35.8 °C) (09/07/22 1045)  Pulse: 92 (09/07/22 1045)  Resp: 18 (09/07/22 1045)  BP: (!) 115/55 (09/07/22 1045)  SpO2: 99 % (09/07/22 1045)   Vital Signs (24h Range):  Temp:  [96.4 °F (35.8 °C)-99 °F (37.2 °C)] 96.4 °F (35.8 °C)  Pulse:  [] 92  Resp:  [16-22] 18  SpO2:  [99 %-100 %] 99 %  BP: (103-139)/(51-67) 115/55     Weight: 63.5 kg (140 lb)  Body mass index is 25.61 kg/m².    Intake/Output Summary (Last 24 hours) at 9/7/2022 1224  Last data filed at 9/7/2022 1045  Gross per 24 hour   Intake 1235 ml   Output --   Net 1235 ml      Physical Exam  Constitutional:       General: She is not in acute distress.  HENT:      Head: Atraumatic.   Eyes:      Comments: Pale conjunctiva   Cardiovascular:      Rate and Rhythm: Normal rate and regular rhythm.      Heart sounds: Normal heart sounds. No murmur heard.  Pulmonary:      Effort: Pulmonary effort is normal.      Breath sounds: Normal breath sounds. No wheezing.   Abdominal:      General: Bowel sounds are normal. There is no distension.      Palpations: Abdomen is soft.      Tenderness: There is no abdominal tenderness.   Musculoskeletal:         General: No deformity. Normal range of motion.      Cervical back: Neck supple.   Neurological:      Mental Status: She is alert and oriented to person, place, and time.       Significant Labs: All pertinent labs within the past 24 hours have been reviewed.    Significant Imaging: I have reviewed all pertinent imaging  results/findings within the past 24 hours.

## 2022-09-07 NOTE — PROGRESS NOTES
Pt resting comfortably.  No complaints of pain.  Second unit of blood infusing.  Vital signs stable.  Good appetite.  Safety measures in place.

## 2022-09-08 VITALS
WEIGHT: 140 LBS | HEIGHT: 62 IN | OXYGEN SATURATION: 99 % | BODY MASS INDEX: 25.76 KG/M2 | DIASTOLIC BLOOD PRESSURE: 56 MMHG | HEART RATE: 79 BPM | RESPIRATION RATE: 16 BRPM | SYSTOLIC BLOOD PRESSURE: 122 MMHG | TEMPERATURE: 97 F

## 2022-09-08 PROBLEM — D21.9 LEIOMYOMA: Status: ACTIVE | Noted: 2022-09-08

## 2022-09-08 LAB
ANION GAP SERPL CALC-SCNC: 8 MMOL/L (ref 8–16)
ANISOCYTOSIS BLD QL SMEAR: ABNORMAL
BASOPHILS # BLD AUTO: 0.07 K/UL (ref 0–0.2)
BASOPHILS NFR BLD: 1.2 % (ref 0–1.9)
BUN SERPL-MCNC: 7 MG/DL (ref 6–20)
CALCIUM SERPL-MCNC: 8.8 MG/DL (ref 8.7–10.5)
CHLORIDE SERPL-SCNC: 111 MMOL/L (ref 95–110)
CO2 SERPL-SCNC: 20 MMOL/L (ref 23–29)
CREAT SERPL-MCNC: 0.7 MG/DL (ref 0.5–1.4)
DACRYOCYTES BLD QL SMEAR: ABNORMAL
DIFFERENTIAL METHOD: ABNORMAL
EOSINOPHIL # BLD AUTO: 0.1 K/UL (ref 0–0.5)
EOSINOPHIL NFR BLD: 2 % (ref 0–8)
ERYTHROCYTE [DISTWIDTH] IN BLOOD BY AUTOMATED COUNT: ABNORMAL % (ref 11.5–14.5)
EST. GFR  (NO RACE VARIABLE): >60 ML/MIN/1.73 M^2
GLUCOSE SERPL-MCNC: 84 MG/DL (ref 70–110)
HCT VFR BLD AUTO: 25.4 % (ref 37–48.5)
HGB BLD-MCNC: 7.4 G/DL (ref 12–16)
IMM GRANULOCYTES # BLD AUTO: 0.03 K/UL (ref 0–0.04)
IMM GRANULOCYTES NFR BLD AUTO: 0.5 % (ref 0–0.5)
LYMPHOCYTES # BLD AUTO: 2 K/UL (ref 1–4.8)
LYMPHOCYTES NFR BLD: 33.1 % (ref 18–48)
MAGNESIUM SERPL-MCNC: 1.9 MG/DL (ref 1.6–2.6)
MCH RBC QN AUTO: 20.8 PG (ref 27–31)
MCHC RBC AUTO-ENTMCNC: 29.1 G/DL (ref 32–36)
MCV RBC AUTO: 72 FL (ref 82–98)
MONOCYTES # BLD AUTO: 0.7 K/UL (ref 0.3–1)
MONOCYTES NFR BLD: 11.8 % (ref 4–15)
NEUTROPHILS # BLD AUTO: 3.1 K/UL (ref 1.8–7.7)
NEUTROPHILS NFR BLD: 51.4 % (ref 38–73)
NRBC BLD-RTO: 2 /100 WBC
OVALOCYTES BLD QL SMEAR: ABNORMAL
PHOSPHATE SERPL-MCNC: 5.2 MG/DL (ref 2.7–4.5)
PLATELET # BLD AUTO: 422 K/UL (ref 150–450)
PLATELET BLD QL SMEAR: ABNORMAL
PMV BLD AUTO: 10.3 FL (ref 9.2–12.9)
POTASSIUM SERPL-SCNC: 4 MMOL/L (ref 3.5–5.1)
RBC # BLD AUTO: 3.55 M/UL (ref 4–5.4)
SODIUM SERPL-SCNC: 139 MMOL/L (ref 136–145)
WBC # BLD AUTO: 6.08 K/UL (ref 3.9–12.7)

## 2022-09-08 PROCEDURE — 36415 COLL VENOUS BLD VENIPUNCTURE: CPT | Performed by: HOSPITALIST

## 2022-09-08 PROCEDURE — 99239 PR HOSPITAL DISCHARGE DAY,>30 MIN: ICD-10-PCS | Mod: ,,, | Performed by: HOSPITALIST

## 2022-09-08 PROCEDURE — 85025 COMPLETE CBC W/AUTO DIFF WBC: CPT | Performed by: HOSPITALIST

## 2022-09-08 PROCEDURE — 84100 ASSAY OF PHOSPHORUS: CPT | Performed by: HOSPITALIST

## 2022-09-08 PROCEDURE — 99239 HOSP IP/OBS DSCHRG MGMT >30: CPT | Mod: ,,, | Performed by: HOSPITALIST

## 2022-09-08 PROCEDURE — 80048 BASIC METABOLIC PNL TOTAL CA: CPT | Performed by: HOSPITALIST

## 2022-09-08 PROCEDURE — 83735 ASSAY OF MAGNESIUM: CPT | Performed by: HOSPITALIST

## 2022-09-08 RX ORDER — FERROUS SULFATE 325(65) MG
325 TABLET, DELAYED RELEASE (ENTERIC COATED) ORAL EVERY OTHER DAY
Qty: 45 TABLET | Refills: 0 | Status: SHIPPED | OUTPATIENT
Start: 2022-09-08 | End: 2022-12-07

## 2022-09-08 RX ORDER — AMOXICILLIN 250 MG
1 CAPSULE ORAL DAILY
Qty: 90 TABLET | Refills: 0 | Status: SHIPPED | OUTPATIENT
Start: 2022-09-08

## 2022-09-08 NOTE — PLAN OF CARE
CM met with pt for final discharge planning assessment.    Pt ready for discharge home today. Meds sent to [facility] for delivery.    Follow-up apt with [facility] scheduled and is in AVS.    Pt confirms no additioal CM needs for discharge.    Pt ready from CM perspective.    [facility] - Med Surg ([place])  Discharge Final Note    Primary Care Provider: JARETH Dee    Expected Discharge Date: 9/8/2022    Final Discharge Note (most recent)       Final Note - 09/08/22 1213          Final Note    Assessment Type Final Discharge Note (P)      Anticipated Discharge Disposition Home or Self Care (P)      What phone number can be called within the next 1-3 days to see how you are doing after discharge? [phone] (P)         Post-Acute Status    Discharge Delays None known at this time (P)                      Important Message from Medicare             Contact Info       Follow-up with PCP and GYN in clinic as arranged by case management        Next Steps: Schedule an appointment as soon as possible for a visit in 2 week(s)

## 2022-09-08 NOTE — PLAN OF CARE
09/08/2022      Lea Jarvis  [address] [facility] [address]          [facility] Medicine Dept.  [facility]  [address] [place] [address]  [phone] Lea Jarvis has been hospitalized at the [facility] since 9/6/2022.  Please excuse the patient from duties.  Patient may return to work/school without restrictions.                   __________________________  Jesús Manrique MD  09/08/2022

## 2022-09-08 NOTE — PLAN OF CARE
POC reviewed with patient, questions and concerns addressed. No acute events through the night.  All Vital signs stable. No complaints.  AAOx4. Safety maintained.  Bed locked, in lowest position, call light within reach, side rails x2. Mobilized to their highest function. See doc flow sheets for further information. Will continue to monitor.      Problem: Adult Inpatient Plan of Care  Goal: Plan of Care Review  Outcome: Ongoing, Progressing  Goal: Patient-Specific Goal (Individualized)  Outcome: Ongoing, Progressing  Goal: Absence of Hospital-Acquired Illness or Injury  Outcome: Ongoing, Progressing  Goal: Optimal Comfort and Wellbeing  Outcome: Ongoing, Progressing  Goal: Readiness for Transition of Care  Outcome: Ongoing, Progressing     Problem: Fall Injury Risk  Goal: Absence of Fall and Fall-Related Injury  Outcome: Ongoing, Progressing

## 2022-09-08 NOTE — NURSING
Pt reports feeling much better today.  NAD noted.  Pleasant and cooperative.  VSS.  No c/o pain.  PIV removed prior to discharge.  Discharge instructions given, pt states understanding.  Pt escorted to OP pharmacy ambulating with nurse.  Friend to pick pt up for discharge.

## 2022-09-09 NOTE — DISCHARGE SUMMARY
"[facility] [place] [facility]  Discharge Summary      Patient Name: Lea Jarvis  MRN: [id number]  Patient Class: IP- Inpatient  Admission Date: 9/6/2022  Hospital Length of Stay: 2 days  Discharge Date and Time: 9/8/2022  1:51 PM  Attending Physician: Jesús Manrique MD  Discharging Provider: Jesús Manrique MD      HPI:   Gonzales Baltazar, DNP:    "Ms. Tate is a 27 year old female with a PMH of anemia , uterine fibroids, and high cholesterol.  She was sent to the ED from her GYN clinic for a hemoglobin of 3.5. Pt reports intermittent dyspnea on exertion and fatigue. She thought her SOB was due to being out of shape. She denies chest pain, shortness of breath at rest, dizziness, and weakness. Pt denies any acute blood loss, melena, hematuria or abdominal pain. Pt has h/o menorrhagia with usual cycles lasting 7-8 days. Pt believes she is on the last day of her current cycle. She had recent IUD placement but is concerned that it has fallen out. Pt was at her GYN today to evaluate for IUD placement. They ordered a future pelvic ultrasound because they were unable to confirm placement. Pt had a blood transfusion in 2020 for symptomatic anemia with a hgb of 5. She was prescribed iron supplements and the nuva ring. Ms Tate reports that she has not been taking the iron supplements for almost one year. She was having constipation even with colace use. Pt to receive packed RBC transfusion an is admitted to hospital medicine."      Hospital Course:   Patient is a 27 year woman with history of iron-deficient anemia attributed to menorrhagia with uterine fibroids admitted the hospital with symptomatic severe anemia with initial hemoglobin of 3.5 grams/deciliter following recent heavy menstrual bleeding.  Patient denies any change in bowel habits or blood in her stools.  Patient transfused 2 units of packed red blood cells on admission.  Hemoglobin following transfusion improved to 5.9 grams/deciliter.  " Patient transfused an additional (3rd unit of packed red blood cells).  Hemoglobin 7.4 g/dL today and with resolution of significant symptoms of anemia.  Menstrual bleeding resolved.  Patient also transfused intranvous iron to help replenish iron stores.  Patient reports recent heavy menstrual bleeding following placement of intrauterine device placement.  Transvaginal ultrasound reveal fibroids in the uterus and cervix without visualizing IUD. Follow-up plain film also negative for IUD. Suspect intrauterine device may have been expelled.  Patient counseled on the results of testing.  Patient discharged home with outpatient follow-up with close follow-up with gynecology for treatment of abnormal uterine bleeding to decrease risk of further episodes of severe anemia. Patient instructed to take oral iron replacement with stool softener.       Goals of Care Treatment Preferences:  Code Status: Full Code      Consults:   Consults (From admission, onward)        Status Ordering Provider     Inpatient consult to Gynecology  Once        Provider:  Breanna Rangel MD    Completed PHILLIP ESPAÑA     Inpatient consult to Hematology/Oncology  Once        Provider:  Lorna Mariee MD    Completed TRACY URIOSTEGUI          Final Active Diagnoses:    Diagnosis Date Noted POA    PRINCIPAL PROBLEM:  Symptomatic anemia [D64.9] 10/26/2020 Yes    Menorrhagia [N92.0] 09/07/2022 Yes    Leiomyoma [D21.9] 09/08/2022 Yes    Iron deficiency anemia due to chronic blood loss [D50.0] 09/07/2022 Yes      Problems Resolved During this Admission:       Discharged Condition: Stable    Disposition: Home or Self Care    Follow Up:   Follow-up Information     Follow-up with PCP and GYN in clinic as arranged by case management. Schedule an appointment as soon as possible for a visit in 2 week(s).                     Patient Instructions:      Diet Adult Regular     Notify your health care provider if you experience any of the following:   temperature >100.4     Notify your health care provider if you experience any of the following:  persistent nausea and vomiting or diarrhea     Notify your health care provider if you experience any of the following:  severe uncontrolled pain     Notify your health care provider if you experience any of the following:  difficulty breathing or increased cough     Notify your health care provider if you experience any of the following:  severe persistent headache     Notify your health care provider if you experience any of the following:  worsening rash     Notify your health care provider if you experience any of the following:  persistent dizziness, light-headedness, or visual disturbances     Notify your health care provider if you experience any of the following:  increased confusion or weakness     Activity as tolerated        Medications:  Reconciled Home Medications:      Medication List      START taking these medications    ferrous sulfate 325 (65 FE) MG EC tablet  Take 1 tablet (325 mg total) by mouth every other day.     STOOL SOFTENER-LAXATIVE 8.6-50 mg per tablet  Generic drug: senna-docusate 8.6-50 mg  Take 1 tablet by mouth once daily.        STOP taking these medications    multivitamin with minerals tablet            Indwelling Lines/Drains at time of discharge:   Lines/Drains/Airways     None                 Time spent on the discharge of patient: 35 minutes         Jesús Manrique MD  Department of Hospital Medicine  [facility] Surg ([place])

## 2022-09-14 ENCOUNTER — OFFICE VISIT (OUTPATIENT)
Dept: OBSTETRICS AND GYNECOLOGY | Facility: CLINIC | Age: 27
End: 2022-09-14
Payer: COMMERCIAL

## 2022-09-14 VITALS
WEIGHT: 143.31 LBS | DIASTOLIC BLOOD PRESSURE: 62 MMHG | HEIGHT: 62 IN | BODY MASS INDEX: 26.37 KG/M2 | SYSTOLIC BLOOD PRESSURE: 122 MMHG

## 2022-09-14 DIAGNOSIS — Z30.9 ENCOUNTER FOR CONTRACEPTIVE MANAGEMENT, UNSPECIFIED TYPE: ICD-10-CM

## 2022-09-14 DIAGNOSIS — D21.9 FIBROIDS: Primary | ICD-10-CM

## 2022-09-14 DIAGNOSIS — D50.0 CHRONIC BLOOD LOSS ANEMIA: ICD-10-CM

## 2022-09-14 DIAGNOSIS — N85.8 OTHER SPECIFIED NONINFLAMMATORY DISORDERS OF UTERUS: ICD-10-CM

## 2022-09-14 PROCEDURE — 1159F PR MEDICATION LIST DOCUMENTED IN MEDICAL RECORD: ICD-10-PCS | Mod: CPTII,S$GLB,, | Performed by: OBSTETRICS & GYNECOLOGY

## 2022-09-14 PROCEDURE — 3008F PR BODY MASS INDEX (BMI) DOCUMENTED: ICD-10-PCS | Mod: CPTII,S$GLB,, | Performed by: OBSTETRICS & GYNECOLOGY

## 2022-09-14 PROCEDURE — 3074F PR MOST RECENT SYSTOLIC BLOOD PRESSURE < 130 MM HG: ICD-10-PCS | Mod: CPTII,S$GLB,, | Performed by: OBSTETRICS & GYNECOLOGY

## 2022-09-14 PROCEDURE — 99999 PR PBB SHADOW E&M-EST. PATIENT-LVL II: ICD-10-PCS | Mod: PBBFAC,,,

## 2022-09-14 PROCEDURE — 3074F SYST BP LT 130 MM HG: CPT | Mod: CPTII,S$GLB,, | Performed by: OBSTETRICS & GYNECOLOGY

## 2022-09-14 PROCEDURE — 99999 PR PBB SHADOW E&M-EST. PATIENT-LVL II: CPT | Mod: PBBFAC,,,

## 2022-09-14 PROCEDURE — 3008F BODY MASS INDEX DOCD: CPT | Mod: CPTII,S$GLB,, | Performed by: OBSTETRICS & GYNECOLOGY

## 2022-09-14 PROCEDURE — 1111F PR DISCHARGE MEDS RECONCILED W/ CURRENT OUTPATIENT MED LIST: ICD-10-PCS | Mod: CPTII,S$GLB,, | Performed by: OBSTETRICS & GYNECOLOGY

## 2022-09-14 PROCEDURE — 3078F PR MOST RECENT DIASTOLIC BLOOD PRESSURE < 80 MM HG: ICD-10-PCS | Mod: CPTII,S$GLB,, | Performed by: OBSTETRICS & GYNECOLOGY

## 2022-09-14 PROCEDURE — 1111F DSCHRG MED/CURRENT MED MERGE: CPT | Mod: CPTII,S$GLB,, | Performed by: OBSTETRICS & GYNECOLOGY

## 2022-09-14 PROCEDURE — 3078F DIAST BP <80 MM HG: CPT | Mod: CPTII,S$GLB,, | Performed by: OBSTETRICS & GYNECOLOGY

## 2022-09-14 PROCEDURE — 1159F MED LIST DOCD IN RCRD: CPT | Mod: CPTII,S$GLB,, | Performed by: OBSTETRICS & GYNECOLOGY

## 2022-09-14 PROCEDURE — 99213 PR OFFICE/OUTPT VISIT, EST, LEVL III, 20-29 MIN: ICD-10-PCS | Mod: S$GLB,,, | Performed by: OBSTETRICS & GYNECOLOGY

## 2022-09-14 PROCEDURE — 99213 OFFICE O/P EST LOW 20 MIN: CPT | Mod: S$GLB,,, | Performed by: OBSTETRICS & GYNECOLOGY

## 2022-09-14 RX ORDER — RELUGOLIX, ESTRADIOL HEMIHYDRATE, AND NORETHINDRONE ACETATE 40; 1; .5 MG/1; MG/1; MG/1
1 TABLET, FILM COATED ORAL DAILY
Qty: 30 TABLET | Refills: 11 | Status: SHIPPED | OUTPATIENT
Start: 2022-09-14 | End: 2022-09-14 | Stop reason: CLARIF

## 2022-09-14 RX ORDER — ELAGOLIX AND ESTRADIOL AND NORETHISTERONE 300-1-0.5
1 KIT ORAL DAILY
Qty: 56 CAPSULE | Refills: 5 | Status: SHIPPED | OUTPATIENT
Start: 2022-09-14 | End: 2022-09-14 | Stop reason: CLARIF

## 2022-09-14 RX ORDER — RELUGOLIX, ESTRADIOL HEMIHYDRATE, AND NORETHINDRONE ACETATE 40; 1; .5 MG/1; MG/1; MG/1
1 TABLET, FILM COATED ORAL DAILY
Qty: 28 TABLET | Refills: 11 | Status: SHIPPED | OUTPATIENT
Start: 2022-09-14

## 2022-09-14 NOTE — PROGRESS NOTES
"Past medical, surgical, social, family, and obstetric histories; medications; prior records and results; and available outside records were reviewed and updated in the EMR.  Pertinent findings were noted below.    Reason for Visit   No chief complaint on file.    HPI   27 y.o. female     Patient's last menstrual period was 2022 (exact date).    Lea Jarvis is a 26yo  presenting for ED follow up. Patient has a history of anemia requiring blood transfusion and fibroids. [facility] Hb to be 3.5. Patient was transfused 3 units of pRBCs with rise in Hb to 7.4. Patient also had an episode of blood loss anemia requiring blood transfusion in . TVUS shows several intramural and submucosal fibroids as well as a cervical fibroid. Today patient reports feeling better and does not have any symptoms of anemia. She is following with Heme/Onc for iron infusions. She denies any current vaginal bleeding.   Patient reports that her periods started becoming heavy in . She states her cycles last 7-9 days with heavy bleeding on days 2-6. On her heaviest days she saturates 5-6 super plus tampons per day. She has tried OCPs, NuvaRing, and IUD in the past with no relief of symptoms. Most recently the patient had an IUD in place that could not be found on imaging and was likely expelled due to submucosal fibroids and heavy bleeding.   Patient desires to start a new form of contraception and symptomatic relief from her uterine fibroids.     Contraception: None  Pap: No result found,  Patient reports [facility]  Mammogram: N/A    Exam   /62   Ht 5' 2" (1.575 m)   Wt 65 kg (143 lb 4.8 oz)   LMP 2022 (Exact Date)   BMI 26.21 kg/m²     Physical Exam  Constitutional:       Appearance: Normal appearance.   Genitourinary:      Genitourinary Comments: No IUD strings visualized. No fibroid protruding " through cervical os noted.    HENT:      Head: Normocephalic and atraumatic.   Eyes:      Conjunctiva/sclera: Conjunctivae normal.   Cardiovascular:      Rate and Rhythm: Normal rate.   Pulmonary:      Effort: No respiratory distress.   Abdominal:      General: Abdomen is flat. There is no distension.      Palpations: Abdomen is soft.      Tenderness: There is no abdominal tenderness.   Musculoskeletal:         General: Normal range of motion.      Cervical back: Normal range of motion.   Neurological:      General: No focal deficit present.      Mental Status: She is alert and oriented to person, place, and time.   Skin:     General: Skin is warm and dry.   Psychiatric:         Mood and Affect: Mood normal.         Behavior: Behavior normal.     Assessment and Plan   Fibroids  -     relugolix-estradiol-norethindr (MYFEMBREE) 40-1-0.5 mg Tab; Take 1 tablet by mouth once daily.  Dispense: 30 tablet; Refill: 11  -     MRI Female Pelvis W W/O Contrast; Future; Expected date: 09/14/2022    Chronic blood loss anemia  -     relugolix-estradiol-norethindr (MYFEMBREE) 40-1-0.5 mg Tab; Take 1 tablet by mouth once daily.  Dispense: 30 tablet; Refill: 11  -     MRI Female Pelvis W W/O Contrast; Future; Expected date: 09/14/2022    Encounter for contraceptive management, unspecified type  -     Device Authorization Order        Patient with multiple fibroids causing heavy menstrual cycles and chronic blood loss anemia requiring blood transfusions on 2 separate occasions. Patient has failed medical management with OCPs, NuvaRing, and IUD in the past. Patient desires treatment for heavy menstrual bleeding.   Treatment with Myfembree discussed with patient to decrease menstrual bleeding. Counseled patient that this is not a form of birth control and she will require additional contraception to protect her from pregnancy. Patient desires Nexplanon. Authorization sent for Nexplanon and patient will be contacted to schedule  appointment for insertion.   Multiple intramural and submucosal fibroids seen on TVUS. Discussed with patient surgical management with hysteroscopic myomectomy. Patient counseled that fibroids are likely to increase in size over the years. Patient would like to start with Myfembree and will consider surgical management in the future. Prior authorization will need to be obtained for Myfembree. Patient does desire to preserve future fertility.   MRI pelvis ordered to better assess location of fibroids.       Ana Blanc MD  [facility]   OBGYN PGY1

## 2022-09-15 NOTE — PROGRESS NOTES
I have reviewed the notes, assessments, and/or procedures performed by resident, I concur with her/his documentation of Lea Jarvis.

## 2022-09-16 ENCOUNTER — TELEPHONE (OUTPATIENT)
Dept: PHARMACY | Facility: CLINIC | Age: 27
End: 2022-09-16
Payer: COMMERCIAL

## 2022-09-16 ENCOUNTER — TELEPHONE (OUTPATIENT)
Dept: OBSTETRICS AND GYNECOLOGY | Facility: CLINIC | Age: 27
End: 2022-09-16
Payer: COMMERCIAL

## 2022-09-16 DIAGNOSIS — Z30.9 ENCOUNTER FOR CONTRACEPTIVE MANAGEMENT, UNSPECIFIED TYPE: Primary | ICD-10-CM

## 2022-09-19 ENCOUNTER — INFUSION (OUTPATIENT)
Dept: INFUSION THERAPY | Facility: OTHER | Age: 27
End: 2022-09-19
Attending: STUDENT IN AN ORGANIZED HEALTH CARE EDUCATION/TRAINING PROGRAM
Payer: COMMERCIAL

## 2022-09-19 VITALS
SYSTOLIC BLOOD PRESSURE: 119 MMHG | HEART RATE: 78 BPM | TEMPERATURE: 98 F | DIASTOLIC BLOOD PRESSURE: 56 MMHG | OXYGEN SATURATION: 100 % | RESPIRATION RATE: 16 BRPM

## 2022-09-19 DIAGNOSIS — D50.0 IRON DEFICIENCY ANEMIA DUE TO CHRONIC BLOOD LOSS: Primary | ICD-10-CM

## 2022-09-19 PROCEDURE — 25000003 PHARM REV CODE 250: Performed by: STUDENT IN AN ORGANIZED HEALTH CARE EDUCATION/TRAINING PROGRAM

## 2022-09-19 PROCEDURE — 63600175 PHARM REV CODE 636 W HCPCS: Performed by: STUDENT IN AN ORGANIZED HEALTH CARE EDUCATION/TRAINING PROGRAM

## 2022-09-19 PROCEDURE — 96366 THER/PROPH/DIAG IV INF ADDON: CPT

## 2022-09-19 PROCEDURE — 96365 THER/PROPH/DIAG IV INF INIT: CPT

## 2022-09-19 RX ORDER — SODIUM CHLORIDE 0.9 % (FLUSH) 0.9 %
10 SYRINGE (ML) INJECTION
Status: DISCONTINUED | OUTPATIENT
Start: 2022-09-19 | End: 2022-09-19 | Stop reason: HOSPADM

## 2022-09-19 RX ORDER — SODIUM CHLORIDE 0.9 % (FLUSH) 0.9 %
10 SYRINGE (ML) INJECTION
Status: CANCELLED | OUTPATIENT
Start: 2022-09-26

## 2022-09-19 RX ORDER — EPINEPHRINE 0.3 MG/.3ML
0.3 INJECTION SUBCUTANEOUS ONCE AS NEEDED
Status: CANCELLED | OUTPATIENT
Start: 2022-09-26

## 2022-09-19 RX ORDER — HEPARIN 100 UNIT/ML
500 SYRINGE INTRAVENOUS
Status: DISCONTINUED | OUTPATIENT
Start: 2022-09-19 | End: 2022-09-19 | Stop reason: HOSPADM

## 2022-09-19 RX ORDER — METHYLPREDNISOLONE SOD SUCC 125 MG
125 VIAL (EA) INJECTION ONCE AS NEEDED
Status: CANCELLED | OUTPATIENT
Start: 2022-09-26

## 2022-09-19 RX ORDER — HEPARIN 100 UNIT/ML
500 SYRINGE INTRAVENOUS
Status: CANCELLED | OUTPATIENT
Start: 2022-09-26

## 2022-09-19 RX ORDER — EPINEPHRINE 0.3 MG/.3ML
0.3 INJECTION SUBCUTANEOUS ONCE AS NEEDED
Status: DISCONTINUED | OUTPATIENT
Start: 2022-09-19 | End: 2022-09-19 | Stop reason: HOSPADM

## 2022-09-19 RX ORDER — DIPHENHYDRAMINE HYDROCHLORIDE 50 MG/ML
50 INJECTION INTRAMUSCULAR; INTRAVENOUS ONCE AS NEEDED
Status: DISCONTINUED | OUTPATIENT
Start: 2022-09-19 | End: 2022-09-19 | Stop reason: HOSPADM

## 2022-09-19 RX ORDER — DIPHENHYDRAMINE HYDROCHLORIDE 50 MG/ML
50 INJECTION INTRAMUSCULAR; INTRAVENOUS ONCE AS NEEDED
Status: CANCELLED | OUTPATIENT
Start: 2022-09-26

## 2022-09-19 RX ORDER — METHYLPREDNISOLONE SOD SUCC 125 MG
125 VIAL (EA) INJECTION ONCE AS NEEDED
Status: DISCONTINUED | OUTPATIENT
Start: 2022-09-19 | End: 2022-09-19 | Stop reason: HOSPADM

## 2022-09-19 RX ADMIN — SODIUM CHLORIDE: 0.9 INJECTION, SOLUTION INTRAVENOUS at 02:09

## 2022-09-19 RX ADMIN — IRON SUCROSE 300 MG: 20 INJECTION, SOLUTION INTRAVENOUS at 02:09

## 2022-09-19 NOTE — PLAN OF CARE
Venofer infusion complete. Pt tolerated well. VSS. NAD. IV DC'd.  Pt verbalized understanding of discharge instructions before leaving.

## 2022-09-26 ENCOUNTER — INFUSION (OUTPATIENT)
Dept: INFUSION THERAPY | Facility: OTHER | Age: 27
End: 2022-09-26
Attending: STUDENT IN AN ORGANIZED HEALTH CARE EDUCATION/TRAINING PROGRAM
Payer: COMMERCIAL

## 2022-09-26 VITALS
WEIGHT: 151 LBS | BODY MASS INDEX: 27.62 KG/M2 | OXYGEN SATURATION: 99 % | SYSTOLIC BLOOD PRESSURE: 106 MMHG | RESPIRATION RATE: 17 BRPM | TEMPERATURE: 99 F | DIASTOLIC BLOOD PRESSURE: 60 MMHG | HEART RATE: 85 BPM

## 2022-09-26 DIAGNOSIS — D50.0 IRON DEFICIENCY ANEMIA DUE TO CHRONIC BLOOD LOSS: Primary | ICD-10-CM

## 2022-09-26 PROCEDURE — 96366 THER/PROPH/DIAG IV INF ADDON: CPT

## 2022-09-26 PROCEDURE — 25000003 PHARM REV CODE 250: Performed by: STUDENT IN AN ORGANIZED HEALTH CARE EDUCATION/TRAINING PROGRAM

## 2022-09-26 PROCEDURE — 63600175 PHARM REV CODE 636 W HCPCS: Performed by: STUDENT IN AN ORGANIZED HEALTH CARE EDUCATION/TRAINING PROGRAM

## 2022-09-26 PROCEDURE — 96365 THER/PROPH/DIAG IV INF INIT: CPT

## 2022-09-26 RX ORDER — EPINEPHRINE 0.3 MG/.3ML
0.3 INJECTION SUBCUTANEOUS ONCE AS NEEDED
Status: DISCONTINUED | OUTPATIENT
Start: 2022-09-26 | End: 2022-09-26 | Stop reason: HOSPADM

## 2022-09-26 RX ORDER — SODIUM CHLORIDE 0.9 % (FLUSH) 0.9 %
10 SYRINGE (ML) INJECTION
Status: CANCELLED | OUTPATIENT
Start: 2022-10-03

## 2022-09-26 RX ORDER — DIPHENHYDRAMINE HYDROCHLORIDE 50 MG/ML
50 INJECTION INTRAMUSCULAR; INTRAVENOUS ONCE AS NEEDED
Status: DISCONTINUED | OUTPATIENT
Start: 2022-09-26 | End: 2022-09-26 | Stop reason: HOSPADM

## 2022-09-26 RX ORDER — METHYLPREDNISOLONE SOD SUCC 125 MG
125 VIAL (EA) INJECTION ONCE AS NEEDED
Status: DISCONTINUED | OUTPATIENT
Start: 2022-09-26 | End: 2022-09-26 | Stop reason: HOSPADM

## 2022-09-26 RX ORDER — SODIUM CHLORIDE 0.9 % (FLUSH) 0.9 %
10 SYRINGE (ML) INJECTION
Status: DISCONTINUED | OUTPATIENT
Start: 2022-09-26 | End: 2022-09-26 | Stop reason: HOSPADM

## 2022-09-26 RX ORDER — HEPARIN 100 UNIT/ML
500 SYRINGE INTRAVENOUS
Status: CANCELLED | OUTPATIENT
Start: 2022-10-03

## 2022-09-26 RX ORDER — METHYLPREDNISOLONE SOD SUCC 125 MG
125 VIAL (EA) INJECTION ONCE AS NEEDED
Status: CANCELLED | OUTPATIENT
Start: 2022-10-03

## 2022-09-26 RX ORDER — HEPARIN 100 UNIT/ML
500 SYRINGE INTRAVENOUS
Status: DISCONTINUED | OUTPATIENT
Start: 2022-09-26 | End: 2022-09-26 | Stop reason: HOSPADM

## 2022-09-26 RX ORDER — EPINEPHRINE 0.3 MG/.3ML
0.3 INJECTION SUBCUTANEOUS ONCE AS NEEDED
Status: CANCELLED | OUTPATIENT
Start: 2022-10-03

## 2022-09-26 RX ORDER — DIPHENHYDRAMINE HYDROCHLORIDE 50 MG/ML
50 INJECTION INTRAMUSCULAR; INTRAVENOUS ONCE AS NEEDED
Status: CANCELLED | OUTPATIENT
Start: 2022-10-03

## 2022-09-26 RX ADMIN — IRON SUCROSE 300 MG: 20 INJECTION, SOLUTION INTRAVENOUS at 02:09

## 2022-09-26 RX ADMIN — SODIUM CHLORIDE: 0.9 INJECTION, SOLUTION INTRAVENOUS at 02:09

## 2022-09-27 ENCOUNTER — NURSE TRIAGE (OUTPATIENT)
Dept: ADMINISTRATIVE | Facility: CLINIC | Age: 27
End: 2022-09-27
Payer: COMMERCIAL

## 2022-09-27 ENCOUNTER — HOSPITAL ENCOUNTER (EMERGENCY)
Facility: OTHER | Age: 27
Discharge: HOME | End: 2022-09-27
Attending: EMERGENCY MEDICINE
Payer: COMMERCIAL

## 2022-09-27 ENCOUNTER — PATIENT MESSAGE (OUTPATIENT)
Dept: OBSTETRICS AND GYNECOLOGY | Facility: CLINIC | Age: 27
End: 2022-09-27
Payer: COMMERCIAL

## 2022-09-27 VITALS
TEMPERATURE: 99 F | RESPIRATION RATE: 16 BRPM | WEIGHT: 150 LBS | DIASTOLIC BLOOD PRESSURE: 60 MMHG | HEART RATE: 87 BPM | HEIGHT: 62 IN | BODY MASS INDEX: 27.6 KG/M2 | SYSTOLIC BLOOD PRESSURE: 103 MMHG | OXYGEN SATURATION: 100 %

## 2022-09-27 DIAGNOSIS — N92.1 MENOMETRORRHAGIA: ICD-10-CM

## 2022-09-27 DIAGNOSIS — N80.9 ENDOMETRIOSIS: ICD-10-CM

## 2022-09-27 DIAGNOSIS — N93.9 VAGINAL BLEEDING: Primary | ICD-10-CM

## 2022-09-27 LAB
ABO + RH BLD: NORMAL
B-HCG UR QL: NEGATIVE
BASOPHILS # BLD AUTO: 0.03 K/UL (ref 0–0.2)
BASOPHILS NFR BLD: 0.4 % (ref 0–1.9)
BILIRUB UR QL STRIP: NEGATIVE
BLD GP AB SCN CELLS X3 SERPL QL: NORMAL
CLARITY UR: CLEAR
COLOR UR: YELLOW
CTP QC/QA: YES
DIFFERENTIAL METHOD: ABNORMAL
EOSINOPHIL # BLD AUTO: 0.1 K/UL (ref 0–0.5)
EOSINOPHIL NFR BLD: 1 % (ref 0–8)
ERYTHROCYTE [DISTWIDTH] IN BLOOD BY AUTOMATED COUNT: 28.1 % (ref 11.5–14.5)
GLUCOSE UR QL STRIP: NEGATIVE
HCG INTACT+B SERPL-ACNC: <1.2 MIU/ML
HCT VFR BLD AUTO: 28.5 % (ref 37–48.5)
HGB BLD-MCNC: 8.4 G/DL (ref 12–16)
HGB UR QL STRIP: ABNORMAL
IMM GRANULOCYTES # BLD AUTO: 0.03 K/UL (ref 0–0.04)
IMM GRANULOCYTES NFR BLD AUTO: 0.4 % (ref 0–0.5)
KETONES UR QL STRIP: NEGATIVE
LEUKOCYTE ESTERASE UR QL STRIP: NEGATIVE
LYMPHOCYTES # BLD AUTO: 1.1 K/UL (ref 1–4.8)
LYMPHOCYTES NFR BLD: 13.1 % (ref 18–48)
MCH RBC QN AUTO: 22.8 PG (ref 27–31)
MCHC RBC AUTO-ENTMCNC: 29.5 G/DL (ref 32–36)
MCV RBC AUTO: 77 FL (ref 82–98)
MICROSCOPIC COMMENT: ABNORMAL
MONOCYTES # BLD AUTO: 0.5 K/UL (ref 0.3–1)
MONOCYTES NFR BLD: 6.7 % (ref 4–15)
NEUTROPHILS # BLD AUTO: 6.3 K/UL (ref 1.8–7.7)
NEUTROPHILS NFR BLD: 78.4 % (ref 38–73)
NITRITE UR QL STRIP: NEGATIVE
NRBC BLD-RTO: 0 /100 WBC
PH UR STRIP: 6 [PH] (ref 5–8)
PLATELET # BLD AUTO: 428 K/UL (ref 150–450)
PMV BLD AUTO: 10.1 FL (ref 9.2–12.9)
PROT UR QL STRIP: NEGATIVE
RBC # BLD AUTO: 3.69 M/UL (ref 4–5.4)
RBC #/AREA URNS HPF: 8 /HPF (ref 0–4)
SP GR UR STRIP: <=1.005 (ref 1–1.03)
SQUAMOUS #/AREA URNS HPF: 6 /HPF
URN SPEC COLLECT METH UR: ABNORMAL
UROBILINOGEN UR STRIP-ACNC: NEGATIVE EU/DL
WBC # BLD AUTO: 8.02 K/UL (ref 3.9–12.7)

## 2022-09-27 PROCEDURE — 81000 URINALYSIS NONAUTO W/SCOPE: CPT | Performed by: PHYSICIAN ASSISTANT

## 2022-09-27 PROCEDURE — 84702 CHORIONIC GONADOTROPIN TEST: CPT | Performed by: EMERGENCY MEDICINE

## 2022-09-27 PROCEDURE — 99213 OFFICE O/P EST LOW 20 MIN: CPT | Mod: ,,, | Performed by: OBSTETRICS & GYNECOLOGY

## 2022-09-27 PROCEDURE — 96374 THER/PROPH/DIAG INJ IV PUSH: CPT

## 2022-09-27 PROCEDURE — 99213 PR OFFICE/OUTPT VISIT, EST, LEVL III, 20-29 MIN: ICD-10-PCS | Mod: ,,, | Performed by: OBSTETRICS & GYNECOLOGY

## 2022-09-27 PROCEDURE — 63600175 PHARM REV CODE 636 W HCPCS

## 2022-09-27 PROCEDURE — 81025 URINE PREGNANCY TEST: CPT | Performed by: PHYSICIAN ASSISTANT

## 2022-09-27 PROCEDURE — 86901 BLOOD TYPING SEROLOGIC RH(D): CPT

## 2022-09-27 PROCEDURE — 99284 EMERGENCY DEPT VISIT MOD MDM: CPT | Mod: 25

## 2022-09-27 PROCEDURE — 85025 COMPLETE CBC W/AUTO DIFF WBC: CPT | Performed by: PHYSICIAN ASSISTANT

## 2022-09-27 RX ORDER — TRANEXAMIC ACID 650 MG/1
1300 TABLET ORAL 3 TIMES DAILY
Qty: 30 TABLET | Refills: 0 | Status: SHIPPED | OUTPATIENT
Start: 2022-09-27 | End: 2022-10-02

## 2022-09-27 RX ORDER — KETOROLAC TROMETHAMINE 30 MG/ML
30 INJECTION, SOLUTION INTRAMUSCULAR; INTRAVENOUS
Status: DISCONTINUED | OUTPATIENT
Start: 2022-09-27 | End: 2022-09-27

## 2022-09-27 RX ORDER — KETOROLAC TROMETHAMINE 30 MG/ML
30 INJECTION, SOLUTION INTRAMUSCULAR; INTRAVENOUS
Status: COMPLETED | OUTPATIENT
Start: 2022-09-27 | End: 2022-09-27

## 2022-09-27 RX ORDER — IBUPROFEN 600 MG/1
600 TABLET ORAL 3 TIMES DAILY
Qty: 20 TABLET | Refills: 0 | Status: SHIPPED | OUTPATIENT
Start: 2022-09-27 | End: 2022-10-04

## 2022-09-27 RX ADMIN — KETOROLAC TROMETHAMINE 30 MG: 30 INJECTION, SOLUTION INTRAMUSCULAR; INTRAVENOUS at 01:09

## 2022-09-27 NOTE — HPI
Lea Jarvis is a 27 y.o. [id number] who presents for vaginal bleeding. In short, the patient was admitted to [facility] in month for anemia and received 3u pRBC and IV Fe. The patient is known to the GYN service for menorrhagia 2/2 uterine fibroids. 7d prior she started myfembree. Yesterday she started her period, which was heavier than normal, changing pads [id number] w/ heavy clots. This am, bleeding has slowed. She endorses cramping. No SOB, MOORE, CP. Otherwise she has no complaints.

## 2022-09-27 NOTE — CONSULTS
[facility] Emergency Dept  Obstetrics & Gynecology  Consult Note    Patient Name: Lea Jarvis  MRN: [id number]  Admission Date: 2022  Hospital Length of Stay: 0 days  Code Status: Prior  Primary Care Provider: JARETH Dee  Principal Problem: <principal problem not specified>    Inpatient consult to Obstetrics / Gynecology  Consult performed by: Jayson Gloria MD  Consult ordered by: Tisha Bautista PA-C  Reason for consult: AUB        Subjective:     Chief Complaint: AUB    History of Present Illness:  Lea Jarvis is a 27 y.o. [id number] who presents for vaginal bleeding. In short, the patient was admitted to [facility] in month for anemia and received 3u pRBC and IV Fe. The patient is known to the GYN service for menorrhagia 2/2 uterine fibroids. 7d prior she started myfembree. Yesterday she started her period, which was heavier than normal, changing pads [id number] w/ heavy clots. This am, bleeding has slowed. She endorses cramping. No SOB, MOORE, CP. Otherwise she has no complaints.           OB History    Para Term  AB Living   0 0 0 0 0 0   SAB IAB Ectopic Multiple Live Births   0 0 0 0 0     Past Medical History:   Diagnosis Date    Anemia     Fibroids      No past surgical history on file.    (Not in a hospital admission)      Review of patient's allergies indicates:  No Known Allergies     Family History       Problem Relation (Age of Onset)    Colon cancer Paternal Grandfather    Diabetes Paternal Uncle, Paternal Grandfather    Hyperlipidemia Mother, Father          Tobacco Use    Smoking status: Never    Smokeless tobacco: Never   Substance and Sexual Activity    Alcohol use: Yes     Comment: occ    Drug use: Not Currently    Sexual activity: Not Currently     Partners: Male     Birth control/protection: I.U.D.     Review of Systems   Constitutional:  Negative for chills and fever.   HENT: Negative.     Eyes:  Negative for visual disturbance.   Respiratory:  Negative  for shortness of breath.    Cardiovascular:  Negative for chest pain and palpitations.   Gastrointestinal:  Positive for abdominal pain. Negative for bloating, constipation, diarrhea, nausea and vomiting.   Genitourinary:  Positive for menorrhagia. Negative for vaginal bleeding, vaginal discharge and vaginal pain.   Musculoskeletal:  Negative for back pain and myalgias.   Neurological:  Negative for headaches.   Hematological: Negative.    Psychiatric/Behavioral:  The patient is not nervous/anxious.     Objective:     Vital Signs (Most Recent):  Temp: 98.8 °F (37.1 °C) (09/27/22 0950)  Pulse: 87 (09/27/22 1327)  Resp: 16 (09/27/22 1327)  BP: 103/60 (09/27/22 1241)  SpO2: 100 % (09/27/22 1327) Vital Signs (24h Range):  Temp:  [98.8 °F (37.1 °C)] 98.8 °F (37.1 °C)  Pulse:  [] 87  Resp:  [16-21] 16  SpO2:  [99 %-100 %] 100 %  BP: (103-123)/(58-71) 103/60     Weight: 68 kg (150 lb)  Body mass index is 27.44 kg/m².    Patient's last menstrual period was 08/29/2022 (exact date).    Physical Exam:   Constitutional: She is oriented to person, place, and time. She appears well-developed and well-nourished. No distress.    HENT:   Head: Normocephalic.    Eyes: EOM are normal. No scleral icterus.     Cardiovascular:  Normal rate.             Pulmonary/Chest: Effort normal.        Abdominal: Soft.     Genitourinary:    Inguinal canal normal.   The external female genitalia was normal.   Cervix exhibits no motion tenderness, no friability and no tenderness.    Genitourinary Comments: Slow bleeding from os. Small amount old clot evacuated from vagina.              Musculoskeletal: Normal range of motion.       Neurological: She is alert and oriented to person, place, and time.    Skin: Skin is warm and dry.    Psychiatric: She has a normal mood and affect.     Laboratory:  CBC:   Recent Labs   Lab 09/27/22  1033   WBC 8.02   RBC 3.69*   HGB 8.4*   HCT 28.5*      MCV 77*   MCH 22.8*   MCHC 29.5*     Urine Pregnancy  Test: No results for input(s): PREGTESTUR in the last 48 hours.  I have personallly reviewed all pertinent lab results from the last 24 hours.    Diagnostic Results:  Labs: Reviewed    Assessment/Plan:     Menorrhagia  - Resumption of normal period  - H/h around baseline. No s/s acute anemia  - Continue myfembree  - Discussed return precautions  - UPT neg  - Discussed with ED provider starting PO TXA x5d and ibuprofen TID        Thank you for your consult. I will sign off. Please contact us if you have any additional questions.    JOLENE Gloria MD  OBGYN PGY-4

## 2022-09-27 NOTE — ASSESSMENT & PLAN NOTE
- Resumption of normal period  - H/h around baseline. No s/s acute anemia  - Continue myfembree  - Discussed return precautions  - UPT neg  - Discussed with ED provider starting PO TXA x5d and ibuprofen TID

## 2022-09-27 NOTE — FIRST PROVIDER EVALUATION
Emergency Department TeleTriage Encounter Note      CHIEF COMPLAINT    Chief Complaint   Patient presents with    Vaginal Bleeding     Heavy vaginal bleeding saturating 1 pad/hr since yesterday with fatigue. Hx of anemia.        VITAL SIGNS   Initial Vitals [09/27/22 0950]   BP Pulse Resp Temp SpO2   123/71 (!) 112 16 98.8 °F (37.1 °C) 99 %      MAP       --            ALLERGIES    Review of patient's allergies indicates:  No Known Allergies    PROVIDER TRIAGE NOTE  This is a teletriage evaluation of a 27 y.o. female presenting to the ED complaining of vaginal bleeding. Patient reports heavy vaginal bleeding since yesterday. She bled through 8 overnight pads in the last 12 hours. She also reports severe abdominal cramps.    Patient is in no distress. She speaks in complete sentences.      Initial orders will be placed and care will be transferred to an alternate provider when patient is roomed for a full evaluation. Any additional orders and the final disposition will be determined by that provider.         ORDERS  Labs Reviewed   CBC W/ AUTO DIFFERENTIAL   URINALYSIS, REFLEX TO URINE CULTURE   POCT URINE PREGNANCY       ED Orders (720h ago, onward)      Start Ordered     Status Ordering Provider    09/27/22 0955 09/27/22 0955  CBC auto differential  STAT         Ordered WAGNERSENA.    09/27/22 0955 09/27/22 0955  Insert Saline lock IV  Once         Ordered SENA EWDARD    09/27/22 0955 09/27/22 0955  Urinalysis, Reflex to Urine Culture Urine, Clean Catch  STAT         Ordered SENA EDWARD.    09/27/22 0955 09/27/22 0955  POCT urine pregnancy  Once         Ordered SENA EDWARD              Virtual Visit Note: The provider triage portion of this emergency department evaluation and documentation was performed via [employer], a HIPAA-compliant telemedicine application, in concert with a tele-presenter in the room. A face to face patient evaluation with one of my colleagues will occur once the patient is  placed in an emergency department room.      DISCLAIMER: This note was prepared with [employer] voice recognition transcription software. Garbled syntax, mangled pronouns, and other bizarre constructions may be attributed to that software system.

## 2022-09-27 NOTE — ED PROVIDER NOTES
"Source of History:  Patient    Chief complaint:  Vaginal Bleeding (Heavy vaginal bleeding saturating 1 pad/hr since yesterday with fatigue. Hx of anemia. )      HPI:  Lea Jarvis is a 27 y.o. female with endometriosis who presents to the ED with vaginal bleeding and pelvic pain.  The 1st day of her last period was 2 days prior.  Patient was hospitalized for iron deficiency anemia worsened by vaginal bleeding a couple weeks prior to arrival today.  She was treated with multiple blood transfusions and discharged to start on a new medication called Mifembry.  Additionally, she states that she had her 3rd iron infusion yesterday. Starting last night she started noticing increased vaginal bleeding, going through about 1 fold nighttime pad every 20 hours.  She reports passing multiple clots.    This is the extent to the patients complaints today here in the emergency department.    ROS: As per HPI and below:  Constitutional:  No fever or chills. No weight loss  ENT: Denies sore throat or congestion.  Eyes: No visual loss or changes  Cardiovascular: No chest pain or palpitations  Respiratory: No shortness of breath or cough  GI:  +pelvic pain.  :  +vaginal bleeding.  Skin: No rashes or lesions  Musculoskeletal: No arthralgias or myalgias  Neuro: No loss of consciousness headache or dizziness  Immunology:  Not immunocompromised    Review of patient's allergies indicates:  No Known Allergies    PMH:  As per HPI and below:  Past Medical History:   Diagnosis Date    Anemia     Fibroids      No past surgical history on file.    Social History     Tobacco Use    Smoking status: Never    Smokeless tobacco: Never   Substance Use Topics    Alcohol use: Yes     Comment: occ    Drug use: Not Currently       Physical Exam:    /60 (BP Location: Left arm, Patient Position: Lying)   Pulse 87   Temp 98.8 °F (37.1 °C) (Oral)   Resp 16   Ht 5' 2" (1.575 m)   Wt 68 kg (150 lb)   LMP 08/29/2022 (Exact Date)   SpO2 100%   BMI " 27.44 kg/m²   Nurse's notes vitals reviewed  General: Patient alert and oriented well-developed well-nourished.  No distress.  Nontoxic appearing.  HENT: Oropharynx nonerythematous, oral mucosa moist. Normocephalic, atraumatic.   Eyes: Extraocular muscles are intact, normal conjunctiva, normal sclera  Cardiovascular: Regular rate and rhythm no murmurs gallops or rubs  Respiratory: Clear to auscultation bilaterally without wheezing rhonchi or rales  GI:  Mild tenderness to palpation to right and left lower quadrants of abdomen.  Bowel sounds normal.  :  Defer pelvic examination to Ob/gyn.  Musculoskeletal:  Normal range of motion.  No obvious deformities, no edema, normal cap refill  Skin: Warm and dry no rashes or lesions, no ecchymosis. Normal skin turgor  Neuro: alert and oriented x3  Psych: Normal mood and affect     Labs that have been ordered have been independently reviewed and interpreted by myself.    MDM:    Urgent evaluation of a 27 y.o. female with heavy vaginal bleeding.  Patient is afebrile, nontoxic appearing and hemodynamically stable.  She is currently a patient of Ob/gyn here at [facility] for which she is being monitored and treated for endometriosis.  Given that she was recently hospitalized for iron deficiency anemia and treated with blood transfusions.  Plan for basic lab work, but include blood screen and type.    Differential Diagnosis includes, but is not limited to:  Endometriosis, uterine leiomyoma, ovarian cyst/torsion, STD, foreign body, trauma, normal menstruation.      ED Course as of 09/27/22 1444   Tue Sep 27, 2022   1107 Hemoglobin(!): 8.4 [JOLANTA]   1108 Hematocrit(!): 28.5 [JOLANTA]   1327 Spoke with the OB/Gyn resident on-call who will come down and performed a pelvic exam and provide further recommendations for patient's care. [JOLANTA]   1328 HCG Quant: <1.2 [JOLANTA]   1330 Ob/gyn who saw her and performed pelvic exam states that she is stable and ready for discharge at this time.  Plan to  discharge with TXA for 5 days in addition to ibuprofen 600 mg t.i.d. for 7 days. [JOLANTA]   1400 After taking into careful account the patient's history, physical exam findings, as well as empirical and objective data obtained throughout ED workup, I feel no emergent medical condition has been identified. No further evaluation or admission was felt to be required, and the patient is stable for discharge from the ED. The patient was updated with test results, overall clinical impression, and recommended further plan of care, including discharge instructions as provided and outpatient follow-up for continued evaluation and management as needed. All questions were answered. The patient expressed understanding and agreed with current plan for discharge and follow-up plan of care. Strict ED return precautions were provided, including return/worsening of current symptoms, new symptoms, or any other concerns.   [JOLANTA]      ED Course User Index  [JOLANTA] Tisha Bautista PA-C               Diagnostic Impression:    1. Vaginal bleeding    2. Endometriosis    3. Menometrorrhagia         ED Disposition Condition    Discharge Stable            ED Prescriptions       Medication Sig Dispense Start Date End Date Auth. Provider    tranexamic acid (LYSTEDA) 650 mg tablet Take 2 tablets (1,300 mg total) by mouth 3 (three) times daily. for 5 days 30 tablet 9/27/2022 10/2/2022 Tisha Bautista PA-C    ibuprofen (ADVIL,MOTRIN) 600 MG tablet Take 1 tablet (600 mg total) by mouth 3 (three) times daily. for 7 days 20 tablet 9/27/2022 10/4/2022 Tisha Bautista PA-C          Follow-up Information       Follow up With Specialties Details Why Contact [facility]  For re-evaluation [address] [facility] [address]  [phone]      [facility], [place], operated by [facility] Emergency Medicine  If symptoms worsen [address] [facility] [address] [phone]             Tisha Bautista  MURPHY  09/27/22 1440

## 2022-09-27 NOTE — SUBJECTIVE & OBJECTIVE
OB History    Para Term  AB Living   0 0 0 0 0 0   SAB IAB Ectopic Multiple Live Births   0 0 0 0 0     Past Medical History:   Diagnosis Date    Anemia     Fibroids      No past surgical history on file.    (Not in a hospital admission)      Review of patient's allergies indicates:  No Known Allergies     Family History       Problem Relation (Age of Onset)    Colon cancer Paternal Grandfather    Diabetes Paternal Uncle, Paternal Grandfather    Hyperlipidemia Mother, Father          Tobacco Use    Smoking status: Never    Smokeless tobacco: Never   Substance and Sexual Activity    Alcohol use: Yes     Comment: occ    Drug use: Not Currently    Sexual activity: Not Currently     Partners: Male     Birth control/protection: I.U.D.     Review of Systems   Constitutional:  Negative for chills and fever.   HENT: Negative.     Eyes:  Negative for visual disturbance.   Respiratory:  Negative for shortness of breath.    Cardiovascular:  Negative for chest pain and palpitations.   Gastrointestinal:  Positive for abdominal pain. Negative for bloating, constipation, diarrhea, nausea and vomiting.   Genitourinary:  Positive for menorrhagia. Negative for vaginal bleeding, vaginal discharge and vaginal pain.   Musculoskeletal:  Negative for back pain and myalgias.   Neurological:  Negative for headaches.   Hematological: Negative.    Psychiatric/Behavioral:  The patient is not nervous/anxious.     Objective:     Vital Signs (Most Recent):  Temp: 98.8 °F (37.1 °C) (22 0950)  Pulse: 87 (22 1327)  Resp: 16 (22 1327)  BP: 103/60 (22 1241)  SpO2: 100 % (22 1327) Vital Signs (24h Range):  Temp:  [98.8 °F (37.1 °C)] 98.8 °F (37.1 °C)  Pulse:  [] 87  Resp:  [16-21] 16  SpO2:  [99 %-100 %] 100 %  BP: (103-123)/(58-71) 103/60     Weight: 68 kg (150 lb)  Body mass index is 27.44 kg/m².    Patient's last menstrual period was 2022 (exact date).    Physical Exam:   Constitutional:  She is oriented to person, place, and time. She appears well-developed and well-nourished. No distress.    HENT:   Head: Normocephalic.    Eyes: EOM are normal. No scleral icterus.     Cardiovascular:  Normal rate.             Pulmonary/Chest: Effort normal.        Abdominal: Soft.     Genitourinary:    Inguinal canal normal.   The external female genitalia was normal.   Cervix exhibits no motion tenderness, no friability and no tenderness.    Genitourinary Comments: Slow bleeding from os. Small amount old clot evacuated from vagina.              Musculoskeletal: Normal range of motion.       Neurological: She is alert and oriented to person, place, and time.    Skin: Skin is warm and dry.    Psychiatric: She has a normal mood and affect.     Laboratory:  CBC:   Recent Labs   Lab 09/27/22  1033   WBC 8.02   RBC 3.69*   HGB 8.4*   HCT 28.5*      MCV 77*   MCH 22.8*   MCHC 29.5*     Urine Pregnancy Test: No results for input(s): PREGTESTUR in the last 48 hours.  I have personallly reviewed all pertinent lab results from the last 24 hours.    Diagnostic Results:  Labs: Reviewed

## 2022-10-03 ENCOUNTER — INFUSION (OUTPATIENT)
Dept: INFUSION THERAPY | Facility: OTHER | Age: 27
End: 2022-10-03
Attending: STUDENT IN AN ORGANIZED HEALTH CARE EDUCATION/TRAINING PROGRAM
Payer: COMMERCIAL

## 2022-10-03 VITALS
TEMPERATURE: 99 F | HEART RATE: 95 BPM | RESPIRATION RATE: 16 BRPM | SYSTOLIC BLOOD PRESSURE: 113 MMHG | DIASTOLIC BLOOD PRESSURE: 56 MMHG | OXYGEN SATURATION: 100 %

## 2022-10-03 DIAGNOSIS — D50.0 IRON DEFICIENCY ANEMIA DUE TO CHRONIC BLOOD LOSS: Primary | ICD-10-CM

## 2022-10-03 PROCEDURE — 96366 THER/PROPH/DIAG IV INF ADDON: CPT

## 2022-10-03 PROCEDURE — 63600175 PHARM REV CODE 636 W HCPCS: Performed by: STUDENT IN AN ORGANIZED HEALTH CARE EDUCATION/TRAINING PROGRAM

## 2022-10-03 PROCEDURE — 25000003 PHARM REV CODE 250: Performed by: STUDENT IN AN ORGANIZED HEALTH CARE EDUCATION/TRAINING PROGRAM

## 2022-10-03 PROCEDURE — 96365 THER/PROPH/DIAG IV INF INIT: CPT

## 2022-10-03 RX ORDER — METHYLPREDNISOLONE SOD SUCC 125 MG
125 VIAL (EA) INJECTION ONCE AS NEEDED
Status: CANCELLED | OUTPATIENT
Start: 2022-10-10

## 2022-10-03 RX ORDER — HEPARIN 100 UNIT/ML
500 SYRINGE INTRAVENOUS
Status: CANCELLED | OUTPATIENT
Start: 2022-10-10

## 2022-10-03 RX ORDER — EPINEPHRINE 0.3 MG/.3ML
0.3 INJECTION SUBCUTANEOUS ONCE AS NEEDED
Status: CANCELLED | OUTPATIENT
Start: 2022-10-10

## 2022-10-03 RX ORDER — DIPHENHYDRAMINE HYDROCHLORIDE 50 MG/ML
50 INJECTION INTRAMUSCULAR; INTRAVENOUS ONCE AS NEEDED
Status: CANCELLED | OUTPATIENT
Start: 2022-10-10

## 2022-10-03 RX ORDER — SODIUM CHLORIDE 0.9 % (FLUSH) 0.9 %
10 SYRINGE (ML) INJECTION
Status: CANCELLED | OUTPATIENT
Start: 2022-10-10

## 2022-10-03 RX ORDER — SODIUM CHLORIDE 0.9 % (FLUSH) 0.9 %
10 SYRINGE (ML) INJECTION
Status: DISCONTINUED | OUTPATIENT
Start: 2022-10-03 | End: 2022-10-03 | Stop reason: HOSPADM

## 2022-10-03 RX ORDER — HEPARIN 100 UNIT/ML
500 SYRINGE INTRAVENOUS
Status: DISCONTINUED | OUTPATIENT
Start: 2022-10-03 | End: 2022-10-03 | Stop reason: HOSPADM

## 2022-10-03 RX ADMIN — SODIUM CHLORIDE: 0.9 INJECTION, SOLUTION INTRAVENOUS at 02:10

## 2022-10-03 RX ADMIN — IRON SUCROSE 300 MG: 20 INJECTION, SOLUTION INTRAVENOUS at 02:10

## 2022-10-03 NOTE — PLAN OF CARE
Vital Signs Stable, No apparent distress noted; Pt tolerat___ infusion w/o difficulty.  Port-a-cath (accessed w/a 20g, 0.75in [place]), flushed, heparinized and deaccessed.   Positive blood return noted.  24g piv removed  No bleeding,swelling or drainage noted to the site.  AVS/Discharge instructions given;Pt verbalizes understanding. Next appointments scheduled

## 2022-10-03 NOTE — PLAN OF CARE
Vital Signs Stable, No apparent distress noted; Pt tolerated _Venofer_ infusion w/o difficulty.  24g piv remvd;No bleeding,swelling or drainage noted;coban and gauze applied  Discharge instructions given;Pt verbalizes understanding. Next appointment scheduled  Ambulated from clinic in [place]

## 2022-10-10 ENCOUNTER — INFUSION (OUTPATIENT)
Dept: INFUSION THERAPY | Facility: OTHER | Age: 27
End: 2022-10-10
Attending: STUDENT IN AN ORGANIZED HEALTH CARE EDUCATION/TRAINING PROGRAM
Payer: COMMERCIAL

## 2022-10-10 VITALS
SYSTOLIC BLOOD PRESSURE: 118 MMHG | TEMPERATURE: 98 F | RESPIRATION RATE: 17 BRPM | OXYGEN SATURATION: 99 % | HEART RATE: 101 BPM | DIASTOLIC BLOOD PRESSURE: 64 MMHG

## 2022-10-10 DIAGNOSIS — D50.0 IRON DEFICIENCY ANEMIA DUE TO CHRONIC BLOOD LOSS: Primary | ICD-10-CM

## 2022-10-10 PROCEDURE — 63600175 PHARM REV CODE 636 W HCPCS: Performed by: STUDENT IN AN ORGANIZED HEALTH CARE EDUCATION/TRAINING PROGRAM

## 2022-10-10 PROCEDURE — 25000003 PHARM REV CODE 250: Performed by: STUDENT IN AN ORGANIZED HEALTH CARE EDUCATION/TRAINING PROGRAM

## 2022-10-10 PROCEDURE — 96366 THER/PROPH/DIAG IV INF ADDON: CPT

## 2022-10-10 PROCEDURE — 96365 THER/PROPH/DIAG IV INF INIT: CPT

## 2022-10-10 RX ORDER — METHYLPREDNISOLONE SOD SUCC 125 MG
125 VIAL (EA) INJECTION ONCE AS NEEDED
Status: DISCONTINUED | OUTPATIENT
Start: 2022-10-10 | End: 2022-10-10 | Stop reason: HOSPADM

## 2022-10-10 RX ORDER — DIPHENHYDRAMINE HYDROCHLORIDE 50 MG/ML
50 INJECTION INTRAMUSCULAR; INTRAVENOUS ONCE AS NEEDED
Status: DISCONTINUED | OUTPATIENT
Start: 2022-10-10 | End: 2022-10-10 | Stop reason: HOSPADM

## 2022-10-10 RX ORDER — SODIUM CHLORIDE 0.9 % (FLUSH) 0.9 %
10 SYRINGE (ML) INJECTION
OUTPATIENT
Start: 2022-10-17

## 2022-10-10 RX ORDER — DIPHENHYDRAMINE HYDROCHLORIDE 50 MG/ML
50 INJECTION INTRAMUSCULAR; INTRAVENOUS ONCE AS NEEDED
OUTPATIENT
Start: 2022-10-17

## 2022-10-10 RX ORDER — HEPARIN 100 UNIT/ML
500 SYRINGE INTRAVENOUS
OUTPATIENT
Start: 2022-10-17

## 2022-10-10 RX ORDER — EPINEPHRINE 0.3 MG/.3ML
0.3 INJECTION SUBCUTANEOUS ONCE AS NEEDED
OUTPATIENT
Start: 2022-10-17

## 2022-10-10 RX ORDER — HEPARIN 100 UNIT/ML
500 SYRINGE INTRAVENOUS
Status: DISCONTINUED | OUTPATIENT
Start: 2022-10-10 | End: 2022-10-10 | Stop reason: HOSPADM

## 2022-10-10 RX ORDER — EPINEPHRINE 0.3 MG/.3ML
0.3 INJECTION SUBCUTANEOUS ONCE AS NEEDED
Status: DISCONTINUED | OUTPATIENT
Start: 2022-10-10 | End: 2022-10-10 | Stop reason: HOSPADM

## 2022-10-10 RX ORDER — METHYLPREDNISOLONE SOD SUCC 125 MG
125 VIAL (EA) INJECTION ONCE AS NEEDED
OUTPATIENT
Start: 2022-10-17

## 2022-10-10 RX ORDER — SODIUM CHLORIDE 0.9 % (FLUSH) 0.9 %
10 SYRINGE (ML) INJECTION
Status: DISCONTINUED | OUTPATIENT
Start: 2022-10-10 | End: 2022-10-10 | Stop reason: HOSPADM

## 2022-10-10 RX ADMIN — IRON SUCROSE 300 MG: 20 INJECTION, SOLUTION INTRAVENOUS at 02:10

## 2022-10-10 RX ADMIN — SODIUM CHLORIDE: 0.9 INJECTION, SOLUTION INTRAVENOUS at 02:10

## 2022-10-10 NOTE — PLAN OF CARE
Vital Signs Stable, No apparent distress noted; Pt tolerated ____Venofer__ infusion w/o difficulty.  24g piv remvd;cath intact;No bleeding,swelling or drainage noted;coban and gauze applied  Discharge instructions given;Pt verbalizes understanding. Next appointment scheduled  Ambulated from clinic in [facility].

## 2023-08-02 NOTE — TELEPHONE ENCOUNTER
OOc Rn  Patient is calling for recent adm for anemia, and started a new medicine,  I think the medicine and effecting my cycle.  Yesterday,  she rec'd an iron infusion and started bleeding profusely.  Went through 9 bads yesterday.  Dr. Guanaco Perez.   Care advise is to call 911 now,   for any new or worsening symptoms to callb ack OOC RN  Patient Vu.    Reason for Disposition   SEVERE vaginal bleeding (e.g., continuous red blood from vagina, or large blood clots) and very weak (can't stand)    Protocols used: Vaginal Bleeding - [id number]    
no gum bleeding/no nose bleeding/no skin lumps